# Patient Record
Sex: FEMALE | Race: WHITE | NOT HISPANIC OR LATINO | Employment: OTHER | URBAN - METROPOLITAN AREA
[De-identification: names, ages, dates, MRNs, and addresses within clinical notes are randomized per-mention and may not be internally consistent; named-entity substitution may affect disease eponyms.]

---

## 2017-04-26 ENCOUNTER — GENERIC CONVERSION - ENCOUNTER (OUTPATIENT)
Dept: OTHER | Facility: OTHER | Age: 57
End: 2017-04-26

## 2017-05-02 ENCOUNTER — GENERIC CONVERSION - ENCOUNTER (OUTPATIENT)
Dept: OTHER | Facility: OTHER | Age: 57
End: 2017-05-02

## 2017-07-19 ENCOUNTER — TRANSCRIBE ORDERS (OUTPATIENT)
Dept: ADMINISTRATIVE | Facility: HOSPITAL | Age: 57
End: 2017-07-19

## 2017-07-19 DIAGNOSIS — G35 MS (MULTIPLE SCLEROSIS) (HCC): Primary | ICD-10-CM

## 2017-07-21 ENCOUNTER — GENERIC CONVERSION - ENCOUNTER (OUTPATIENT)
Dept: OTHER | Facility: OTHER | Age: 57
End: 2017-07-21

## 2017-07-24 ENCOUNTER — GENERIC CONVERSION - ENCOUNTER (OUTPATIENT)
Dept: OTHER | Facility: OTHER | Age: 57
End: 2017-07-24

## 2017-08-04 ENCOUNTER — HOSPITAL ENCOUNTER (OUTPATIENT)
Dept: RADIOLOGY | Facility: HOSPITAL | Age: 57
Discharge: HOME | End: 2017-08-04
Payer: COMMERCIAL

## 2017-08-04 DIAGNOSIS — G35 MS (MULTIPLE SCLEROSIS) (HCC): ICD-10-CM

## 2017-08-04 PROCEDURE — A9585 GADOBUTROL INJECTION: HCPCS | Performed by: RADIOLOGY

## 2017-08-04 PROCEDURE — 72156 MRI NECK SPINE W/O & W/DYE: CPT

## 2017-08-04 PROCEDURE — 70553 MRI BRAIN STEM W/O & W/DYE: CPT

## 2017-08-04 RX ADMIN — GADOBUTROL 4 ML: 604.72 INJECTION INTRAVENOUS at 21:33

## 2017-08-07 ENCOUNTER — HOSPITAL ENCOUNTER (OUTPATIENT)
Dept: RADIOLOGY | Facility: HOSPITAL | Age: 57
Discharge: HOME | End: 2017-08-07
Payer: COMMERCIAL

## 2017-08-07 DIAGNOSIS — G35 MS (MULTIPLE SCLEROSIS) (HCC): ICD-10-CM

## 2017-08-07 PROCEDURE — 72157 MRI CHEST SPINE W/O & W/DYE: CPT

## 2017-08-07 PROCEDURE — A9585 GADOBUTROL INJECTION: HCPCS | Performed by: RADIOLOGY

## 2017-08-07 RX ADMIN — GADOBUTROL 4 ML: 604.72 INJECTION INTRAVENOUS at 11:27

## 2017-10-18 ENCOUNTER — ALLSCRIPTS OFFICE VISIT (OUTPATIENT)
Dept: OTHER | Facility: OTHER | Age: 57
End: 2017-10-18

## 2017-10-20 NOTE — PROGRESS NOTES
Assessment  1  Hyperlipidemia (272 4) (E78 5)   2  Multiple sclerosis (340) (G35)   3  Bladder disorder (596 9) (N32 9)   4  Ambulatory dysfunction (719 7) (R26 2)   5  Insomnia (780 52) (G47 00)   6  Vitamin D deficiency (268 9) (E55 9)    Plan  Flu vaccine need    · Fluzone Quadrivalent Intramuscular Suspension  Hyperlipidemia    · Continue with our present treatment plan ; Status:Complete;   Done: 30UBL9490 08:10AM   · Eat a low fat and low cholesterol diet ; Status:Complete;   Done: 34JCP8554 08:10AM   · Keep a diary of when and what you eat ; Status:Complete;   Done: 78KPM5091 08:10AM   · There are many exercise options for seniors ; Status:Complete;   Done: 40HEQ1908  08:10AM   · We recommend that you bring your body mass index down to 26 ; Status:Complete;    Done: 61MRB3078 08:10AM   · We recommend that you follow the Mediterranean diet ; Status:Complete;   Done:  91NLP8622 08:10AM   · You need to quit smoking ; Status:Complete;   Done: 04MXX7893 08:10AM   · Call 911 if: You experience a new kind of chest pain (angina) or pressure ;  Status:Complete;   Done: 66DII3276 08:10AM   · Call 911 if: You have any symptoms of a stroke ; Status:Complete;   Done: 37MXT5670  08:10AM  Insomnia    · From  Zolpidem Tartrate 10 MG Oral Tablet TAKE 1 TABLET Bedtime PRN To  Zolpidem Tartrate 10 MG Oral Tablet (Ambien) 1 Every Day At Bedtime, As Needed    Discussion/Summary    1  Hyperlipidemia-stable 2  MS-stable 3  Bladder disorder-continue present medication and self catheterization  4  Ambulatory dysfunction-fairly stable  5  Vitamin-D deficiency-stable  Patient is otherwise up-to-date on her routine care  She does mention upper GI symptoms consistent with possible GERD  She does take over-the-counter medication and feels that Rolaids work the best  She will try to call her GI doctor and have an EGD before going to [place] flu shot given today   Patient is very good friend and wish her well as she and her  move out of the area  Will likely need primary care follow-up up in [place]  The patient was counseled regarding diagnostic results,-- instructions for management,-- risk factor reductions,-- prognosis,-- patient and family education,-- impressions,-- risks and benefits of treatment options,-- importance of compliance with treatment  total time of encounter was 25 minutes-- and-- > 50% minutes was spent counseling  Chief Complaint  F/U HTN and cholesterol  ksd,cma      History of Present Illness  The patient states her hyperlipidemia has been under good control since the last visit  She has no comorbid illnesses  Symptoms: The patient is currently asymptomatic    50-year-old white female with history of MS here for blood pressure check and review of labs  Since last visit her blood pressures have been quite fine therefore she stopped blood pressure medicine  Under a lot of stress as a result of her 's new job and relocation to [place] in the next few months  Has 2 children in college  Up-to-date with her MRIs and her Neurology evaluations  Bladder issues are stable  Not driving any longer  Up-to-date with gyn care  Requesting flu shot  Has been using a rolling walker without too much difficulty  Review of Systems    Constitutional: as noted in HPI  Eyes: No complaints of eye pain, no red eyes, no eyesight problems, no discharge, no dry eyes, no itching of eyes  ENT: no complaints of earache, no loss of hearing, no nose bleeds, no nasal discharge, no sore throat, no hoarseness  Cardiovascular: No complaints of slow heart rate, no fast heart rate, no chest pain, no palpitations, no leg claudication, no lower extremity edema  Respiratory: No complaints of shortness of breath, no wheezing, no cough, no SOB on exertion, no orthopnea, no PND  Gastrointestinal: No complaints of abdominal pain, no constipation, no nausea or vomiting, no diarrhea, no bloody stools  Genitourinary: as noted in HPI  Musculoskeletal: as noted in HPI  Integumentary: no rashes  Neurological: as noted in HPI-- and-- no headache  Psychiatric: as noted in HPI,-- no anxiety-- and-- no depression  Hematologic/Lymphatic: no tendency for easy bleeding  Active Problems  1  Bladder disorder (596 9) (N32 9)   2  Edema (782 3) (R60 9)   3  Encounter for screening colonoscopy (V76 51) (Z12 11)   4  Encounter for screening mammogram for malignant neoplasm of breast (V76 12)   (Z12 31)   5  Foot Pain (Soft Tissue)   6  Hyperlipidemia (272 4) (E78 5)   7  Insomnia (780 52) (G47 00)   8  Migraine headache (346 90) (G43 909)   9  Multiple sclerosis (340) (G35)   10  Need for influenza vaccination (V04 81) (Z23)   11  Need for prophylactic vaccination and inoculation against influenza (V04 81) (Z23)   12  Osteoporosis (733 00) (M81 0)   13  Urinary incontinence (788 30) (R32)   14  Vitamin D deficiency (268 9) (E55 9)    Past Medical History  1  History of Ankle pain (719 47) (M25 579)   2  History of Backache (724 5) (M54 9)   3  History of Benign essential hypertension (401 1) (I10)   4  History of Foot injury (959 7) (S99 929A)   5  History of chest pain (V13 89) (Z87 898)   6  History of osteopenia (V13 59) (Z87 39)   7  History of upper respiratory infection (V12 09) (Z87 09)   8  History of urinary tract infection (V13 02) (Z87 440)   9  History of Need for influenza vaccination (V04 81) (Z23)   10  Need for prophylactic vaccination and inoculation against influenza (V04 81) (Z23)    Surgical History  1  History of  Section   2  History of Colonoscopy   3  History of Hysterectomy   4  History of Tubal Ligation    Family History  Mother    1  Family history of Lung Cancer (V16 1)  Father    2  Family history of Alcoholism  Sister    3  Family history of Malignant neoplasm of transverse colon  Family History    4   Family history of Acute Myocardial Infarction (V17 3)    Social History   · Being A Social Drinker   · Denied: History of Drug Use   · Never A Smoker    Current Meds   1  Ampyra 10 MG Oral Tablet Extended Release 12 Hour; Take 1 tablet twice daily; Therapy: (Recorded:05Rpz0890) to Recorded   2  Baclofen 10 MG Oral Tablet; TAKE 1 TABLET TWICE DAILY; Therapy: (Recorded:96Key4425) to Recorded   3  Baclofen 20 MG Oral Tablet; TAKE 1 TABLET THREE TIMES A DAY; Therapy: (Recorded:14Xmd9549) to Recorded   4  Boniva 150 MG Oral Tablet; TAKE 1 TABLET ONCE MONTHLY  Requested for:   71Pra1674; Last Rx:39Pgv2519 Ordered   5  Butalbital-APAP-Caffeine -40 MG Oral Tablet; take 1 tablet every 6 hours as   needed for migraines; Therapy: 78CZB0809 to (Last Rx:29Ski2751)  Requested for: 16RNJ8143 Ordered   6  Calcium 600 MG Oral Tablet; Therapy: (Recorded:71Ksg8987) to Recorded   7  Cranberry CAPS; Therapy: (Recorded:90Xgo4699) to Recorded   8  Evening Primrose OIL; Therapy: (Recorded:20Nov2014) to Recorded   9  Fiber CHEW;   Therapy: (Recorded:29Qrb8111) to Recorded   10  Furosemide 40 MG Oral Tablet; TAKE 2 TABLETS EVERY WEEK AS NEEDED FOR    EDEMA; Therapy: 26HJF7857 to (Last Rx:47Wws7526)  Requested for: 26FUB5051 Ordered   11  Gelnique 10 % Transdermal Gel; USE AS DIRECTED, USE WITH SANCTURA AND    OXYBUTYNIN;    Therapy: 82TOC5659 to (Last Rx:99Akn9766)  Requested for: 21Tcm6215 Ordered   12  Gelnique 3 (28) % (MG/ACT) GEL; USE AS DIRECTED, USE WITH SANCTURA AND    OXYBUTYNIN;    Therapy: 05KQQ2508 to (Last Rx:90Hig5340)  Requested for: 19Ham3775 Ordered   13  Nitrofurantoin Monohyd Macro 100 MG Oral Capsule; TAKE 1 CAPSULE EVERY OTHER    DAY AND POST COITAL; Therapy: 61DWK0194 to (Last Rx:73Rsz3819)  Requested for: 02Jan2017 Ordered   14  Oxybutynin Chloride ER 15 MG Oral Tablet Extended Release 24 Hour; TAKE 1 TABLET    DAILY (ALSO TAKE Jerry Reyes 13); Therapy: 41IEA9713 to (Last Rx:11Ogd2495)  Requested for: 30Uwh8717 Ordered   15   Potassium Chloride Cat ER 20 MEQ Oral Tablet Extended Release; Take 1 tablet daily; Therapy: 43Oku0072 to (Last Rx:43Tcw9312)  Requested for: 61XRT6756 Ordered   16  PredniSONE 10 MG Oral Tablet; take as directed; Therapy: 23IEC9204 to (Last Rx:28Nov2016)  Requested for: 02Jan2017 Ordered   17  Restasis 0 05 % Ophthalmic Emulsion; Therapy: (Recorded:36Bgk6560) to Recorded   18  Simvastatin 20 MG Oral Tablet; Take 1 tablet daily; Therapy: 22WKL3119 to (Last Rx:28Nov2016)  Requested for: 02Jan2017 Ordered   19  Sulfamethoxazole-TMP -160 MG TABS; take 1 tablet every twelve hours for four    days prn UTI; Therapy: 12YUU8556 to (Last QD:24TXI3221)  Requested for: 41PJO1833 Ordered   20  Sulfamethoxazole-Trimethoprim 800-160 MG Oral Tablet; TAKE 1 TABLET EVERY 12    HOURS FOR 4 DAYS AS NEEDED FOR URINARY TRACT INFECTION; Therapy: 98TTH4628 to (Last Rx:12Fig2680)  Requested for: 02Sep2017 Ordered   21  Tecfidera 240 MG Oral Capsule Delayed Release; Take 1 capsule twice daily; Therapy: (Recorded:96Mnc0001) to Recorded   22  TiZANidine HCl - 4 MG Oral Tablet; TAKE 1 TABLET 3 times daily; Therapy: (Recorded:96Oub9876) to Recorded   23  Trospium Chloride ER 60 MG Oral Capsule Extended Release 24 Hour; TAKE 1    CAPSULE DAILY (TAKE SANCTURA AND OXYBUTIN  TAKE WITH GELNIQUE AND    OXYBUTIN); Therapy: 93AWZ6858 to (Last Rx:23Cjk6985)  Requested for: 11Lak2365 Ordered   24  Vitamin D3 5000 UNIT Oral Capsule; Therapy: (Recorded:70Aks6791) to Recorded   25  Zolpidem Tartrate 10 MG Oral Tablet; TAKE 1 TABLET Bedtime PRN; Therapy: 01Gip5564 to (Evaluate:46Qmr8322); Last Rx:06Jan2017 Ordered    Allergies  1  No Known Drug Allergies    Vitals  Vital Signs    Recorded: 42IGA9551 03:28ZN   Systolic 831   Diastolic 68   Height 5 ft 5 in   Weight 120 lb    BMI Calculated 19 97   BSA Calculated 1 59     Physical Exam    Constitutional   General appearance: No acute distress, well appearing and well nourished      Eyes   Pupils and irises: Equal, round and reactive to light  Ears, Nose, Mouth, and Throat   Oropharynx: Normal with no erythema, edema, exudate or lesions  Pulmonary   Respiratory effort: No increased work of breathing or signs of respiratory distress  Auscultation of lungs: Clear to auscultation  Cardiovascular   Auscultation of heart: Normal rate and rhythm, normal S1 and S2, without murmurs  Examination of extremities for edema and/or varicosities: Normal     Carotid pulses: Normal     Abdomen   Abdomen: Non-tender, no masses  Lymphatic   Palpation of lymph nodes in neck: No lymphadenopathy  Musculoskeletal   Gait and station: Abnormal  -- needs walker  Digits and nails: Normal without clubbing or cyanosis  Neurologic   Cranial nerves: Cranial nerves 2-12 intact  Psychiatric   Orientation to person, place, and time: Normal     Mood and affect: Normal          Health Management  Encounter for screening colonoscopy   COLONOSCOPY; every 10 years; Last 88FZV9678; Next Due: 23CTU0122; Active  Encounter for screening mammogram for malignant neoplasm of breast   Digital Bilateral Screening Mammogram With CAD; every 1 year; Last 96OZK5635; Next Due:  82MWS0457; Overdue  History of osteopenia   * Dexa Scan Axial Skel (Hip, Pelvis, Spine); every 2 years; Last 43XKI7945; Next Due: 79Vjt2297;   Overdue    Signatures   Electronically signed by : Amada Baumgarten, DO; Oct 19 3872  8:12AM EST                       (Author)

## 2018-01-14 VITALS
DIASTOLIC BLOOD PRESSURE: 68 MMHG | BODY MASS INDEX: 19.99 KG/M2 | SYSTOLIC BLOOD PRESSURE: 114 MMHG | HEIGHT: 65 IN | WEIGHT: 120 LBS

## 2018-01-14 NOTE — PROGRESS NOTES
Chief Complaint  pt here for EKG      Active Problems    1  Benign essential hypertension (401 1) (I10)   2  Edema (782 3) (R60 9)   3  Encounter for screening colonoscopy (V76 51) (Z12 11)   4  Encounter for screening mammogram for malignant neoplasm of breast (V76 12)   (Z12 31)   5  Foot Pain (Soft Tissue)   6  Hyperlipidemia (272 4) (E78 5)   7  Insomnia (780 52) (G47 00)   8  Migraine headache (346 90) (G43 909)   9  Multiple sclerosis (340) (G35)   10  Need for influenza vaccination (V04 81) (Z23)   11  Need for prophylactic vaccination and inoculation against influenza (V04 81) (Z23)   12  Osteopenia (733 90) (M85 80)   13  Osteoporosis (733 00) (M81 0)   14  Upper respiratory infection (465 9) (J06 9)   15  Urinary incontinence (788 30) (R32)   16  Urinary tract infection (599 0) (N39 0)   17  Vitamin D deficiency (268 9) (E55 9)    Current Meds   1  Ampyra 10 MG Oral Tablet Extended Release 12 Hour; Take 1 tablet twice daily; Therapy: (Recorded:15Nov2013) to Recorded   2  Baclofen 10 MG Oral Tablet; TAKE 1 TABLET TWICE DAILY; Therapy: (Recorded:20Nov2014) to Recorded   3  Baclofen 20 MG Oral Tablet; TAKE 1 TABLET THREE TIMES A DAY; Therapy: (Recorded:40Tmc8530) to Recorded   4  Butalbital-APAP-Caffeine -40 MG Oral Tablet; take 1 tablet every 6 hours as   needed for migraines; Therapy: 56UJN3499 to (Last Rx:13Jun2016)  Requested for: 51LDR4684 Ordered   5  Calcium 600 MG Oral Tablet; Therapy: (Recorded:67Eaf2049) to Recorded   6  Cranberry CAPS; Therapy: (Recorded:20Nov2014) to Recorded   7  Evening Primrose OIL; Therapy: (Recorded:20Nov2014) to Recorded   8  Fiber CHEW;   Therapy: (Recorded:20Nov2014) to Recorded   9  Forteo 600 MCG/2 4ML Subcutaneous Solution; INJECT 20 MCG  SUBCUTANEOUSLY   ONCE DAILY AS DIRECTED; Therapy: 46DDF9158 to Recorded   10  Furosemide 40 MG Oral Tablet; TAKE 2 TABLETS EVERY WEEK AS NEEDED FOR    EDEMA;     Therapy: 91WSJ2924 to (Last Rx:47Lhk3529) Requested for: 93Hvz9695 Ordered   11  Gelnique 3 (28) % (MG/ACT) Transdermal Gel; USE AS DIRECTED, USE WITH    SANCTURA AND OXYBUTYNIN;    Therapy: 83NIC5344 to (Last Rx:02Hbv5291)  Requested for: 60Hyq7030 Ordered   12  Nitrofurantoin Monohyd Macro 100 MG Oral Capsule; TAKE 1 CAPSULE EVERY OTHER    DAY AND POST COITAL; Therapy: 12MUG9966 to (Last Rx:81Box9103)  Requested for: 93Hrr2397 Ordered   13  Oxybutynin Chloride ER 15 MG Oral Tablet Extended Release 24 Hour; TAKE 1 TABLET    DAILY  Requested for: 46Dbf8599; Last Rx:10Eov9183 Ordered   14  Potassium Chloride Cat ER 20 MEQ Oral Tablet Extended Release; Take 1 tablet daily; Therapy: 40IBU0678 to (Last Rx:20Nov2014)  Requested for: 23Nov2014 Ordered   15  PredniSONE 10 MG Oral Tablet; take as directed; Therapy: 24WNP3817 to (Last Rx:36Jan7403)  Requested for: 38JFH2852 Ordered   16  Restasis 0 05 % Ophthalmic Emulsion; Therapy: (Recorded:11Lrh7896) to Recorded   17  Simvastatin 20 MG Oral Tablet; Take 1 tablet daily; Therapy: 08ZWK5677 to (Last Rx:37Kcn4387)  Requested for: 48MWN8273 Ordered   18  Sulfamethoxazole-TMP -160 MG TABS; take 1 tablet every twelve hours for four    days prn UTI; Therapy: 60ORB8670 to (Last FH:43PMS4620)  Requested for: 30Jan2015 Ordered   19  Sulfamethoxazole-Trimethoprim 800-160 MG Oral Tablet; TAKE 1 TABLET EVERY 12    HOURS FOR 4 DAYS AS NEEDED FOR URINARY TRACT INFECTION; Therapy: 13KPR6337 to (Last Rx:09Etk1150)  Requested for: 38Rzq0394 Ordered   20  Tecfidera 240 MG Oral Capsule Delayed Release; Take 1 capsule twice daily; Therapy: (Recorded:16Uyl8446) to Recorded   21  TiZANidine HCl - 4 MG Oral Tablet; TAKE 1 TABLET 3 times daily; Therapy: (Recorded:94Fes2408) to Recorded   22  Trospium Chloride ER 60 MG Oral Capsule Extended Release 24 Hour; take 1 capsule    daily; Therapy: 72NVX5319 to (Evaluate:22Ety7973)  Requested for: 15Nvy3688; Last    Rx:82Jsk4012 Ordered   23   Valsartan 80 MG Oral Tablet; TAKE 1 TABLET DAILY (NEW STRENGTH); Therapy: 88VCK2488 to (Last Rx:55Eaj1790)  Requested for: 19Oct2015 Ordered   24  Zolpidem Tartrate 10 MG Oral Tablet; TAKE 1 TABLET Bedtime PRN; Therapy: 86Yxz7762 to (Evaluate:27Mbb5965); Last Rx:88Ibq0439 Ordered    Allergies    1   No Known Drug Allergies    Results/Data  EKG/ECG- POC 21HAB7305 08:13BM Paola Jones     Test Name Result Flag Reference   EKG/ECG 07/18/2016         Plan  Chest pain    · EKG/ECG- POC; Status:Complete - Retrospective By Protocol Authorization;   Done:  01WNJ1494 12:33PM    Signatures   Electronically signed by : Ashley Toro DO; Jul 18 1992  1:07PM EST                       (Author)

## 2018-01-23 NOTE — PROGRESS NOTES
Assessment   1  Encounter for preventive health examination (V70 0) (Z00 00)  2  Benign essential hypertension (401 1) (I10)  3  Hyperlipidemia (272 4) (E78 5)  4  Vitamin D deficiency (268 9) (E55 9)  5  Multiple sclerosis (340) (G35)  6  Bladder disorder (596 9) (N32 9)    Plan  Hyperlipidemia    · (1) LIPID PANEL, FASTING; Status:Active - Retrospective By Protocol Authorization; Requested for:[id number];   Hyperlipidemia, Multiple sclerosis,     · Renew: Potassium Chloride Cat ER 20 MEQ Oral Tablet Extended Release; Take 1  tablet daily  Need for influenza vaccination    · Administered: Fluzone Quadrivalent Intramuscular Suspension    Discussion/Summary  health maintenance visit1  Currently, she  eats a healthy diet1  1   TAH1  Breast cancer screening:  mammogram is current1  1   Colorectal cancer screening:  colorectal cancer screening is current1  1   Osteoporosis screening:  bone mineral density testing is current1  1   The immunizations are needed1  and  Flu shot today1   Advice and education were given regarding  nutrition1  ,  weight bearing exercise1  ,  self skin examination1  and  seat belt use1  1   HM done  LP and BP stable  CPM   aakash 1 yr  Vit D revd  On replacement  MS revd  Need notes  1    The  patient1  was counseled regarding1  diagnostic results1 , instructions for management1 , risk factor reductions1 , prognosis1 , patient and family education1 , impressions1 , risks and benefits of treatment options1 , importance of compliance with treatment1         1 Amended By: Rhona Ford; Dec 18 9137 6:43 PM EST    Chief Complaint  pt here today for a yearly PE as well as a flu vaccine  History of Present Illness    HM, Adult Female: The patient is being seen for a  health maintenance1  evaluation1   General Health: The patient's health since the last visit is described as1   Has MS - fairly stable  1   She has regular dental visits1   She complains of vision problems1    Vision care includes1  wearing reading glasses1   Lifestyle:1   She consumes a diverse and healthy diet1   She does not have any weight concerns1   She does not exercise regularly1   She does not use tobacco1   She denies alcohol use1   She denies drug use1   Reproductive health:1  the patient is perimenopausal1   Screening: Cancer screening reviewed and current1   Cervical cancer screening includes 1  a pap smear performed last year1   Breast cancer screening includes1  a mammogram performed last year1   Colorectal cancer screening includes1  a colonoscopy performed within the past ten years1   Metabolic screening reviewed and current1   Cardiovascular risk factors:1  hypertension1  and stress1 , but no diabetes1 , no obesity1  and no tobacco use1   General health risks:1  no previous breast cancer1   Safety elements used:1  seat belt1 , sunscreen1  and smoke detector1   HPI: HM for this 54 yo wf w MS  UTD w neuro, gyn and GI 1        1 Amended By: Annalise Spicer; Dec 18 7289 6:40 PM EST    Review of Systems    Constitutional:1  as noted in HPI1   Eyes:1  as noted in [address]   ENT:1  no complaints of earache, no loss of hearing, no nose bleeds, no nasal discharge, no sore throat, no hoarseness1   Cardiovascular:1  No complaints of slow heart rate, no fast heart rate, no chest pain, no palpitations, no leg claudication, no lower extremity edema1   Respiratory:1  No complaints of shortness of breath, no wheezing, no cough, no SOB on exertion, no orthopnea, no PND1   Gastrointestinal:1  No complaints of abdominal pain, no constipation, no nausea or vomiting, no diarrhea, no bloody stools1   Genitourinary:1  self cath's1   Musculoskeletal:1  as noted in [address]   Integumentary:1  no rashes1   Neurological:1  as noted in [address]   Psychiatric:1  as noted in [address]   1 Amended By: Annalise Spicer; Dec 18 7909 6:40 PM EST    Active Problems   1  Benign essential hypertension (401 1) (I10)  2   Chest pain (786 50) (R07 9)  3  Edema (782 3) (R60 9)  4  Encounter for screening colonoscopy (V76 51) (Z12 11)  5  Encounter for screening mammogram for malignant neoplasm of breast (V76 12)   (Z12 31)  6  Foot Pain (Soft Tissue)  7  Hyperlipidemia (272 4) (E78 5)  8  Insomnia (780 52) (G47 00)  9  Migraine headache (346 90) (G43 909)  10  Multiple sclerosis (340) (G35)  11  Need for prophylactic vaccination and inoculation against influenza (V04 81) (Z23)  12  Osteopenia (733 90) (M85 80)  13  Osteoporosis (733 00) (M81 0)  14  Upper respiratory infection (465 9) (J06 9)  15  Urinary incontinence (788 30) (R32)  16  Urinary tract infection (599 0) (N39 0)  17  Vitamin D deficiency (268 9) (E55 9)    Past Medical History    · History of Ankle pain (719 47) (M25 579)   · History of Backache (724 5) (M54 9)   · History of Foot injury (959 7) (W99 485A)   · Need for prophylactic vaccination and inoculation against influenza (V04 81) (Z23)    Surgical History    · History of  Section   · History of Hysterectomy   · History of Tubal Ligation    Family History  Mother    · Family history of Lung Cancer (V16 1)  Father    · Family history of Alcoholism  Sister    · Family history of Malignant neoplasm of transverse colon  Family History    · Family history of Acute Myocardial Infarction (V17 3)    Social History    · Being A Social Drinker   · Denied: History of Drug Use   · Never A Smoker    Current Meds  1  Ampyra 10 MG Oral Tablet Extended Release 12 Hour; Take 1 tablet twice daily; Therapy: (Recorded:33Efl6297) to Recorded  2  Baclofen 10 MG Oral Tablet; TAKE 1 TABLET TWICE DAILY; Therapy: (Recorded:82Spb9912) to Recorded  3  Baclofen 20 MG Oral Tablet; TAKE 1 TABLET THREE TIMES A DAY; Therapy: (Recorded:36Rqd6653) to Recorded  4  Butalbital-APAP-Caffeine -40 MG Oral Tablet; take 1 tablet every 6 hours as   needed for migraines; Therapy: 04TER3843 to (Last Rx:2016)  Requested for: 87ESE3140 Ordered  5  Calcium 600 MG Oral Tablet; Therapy: (Recorded:04Gie8729) to Recorded  6  Cranberry CAPS; Therapy: (Recorded:20Nov2014) to Recorded  7  Evening Primrose OIL; Therapy: (Recorded:20Nov2014) to Recorded  8  Fiber CHEW;   Therapy: (Recorded:20Nov2014) to Recorded  9  Forteo 600 MCG/2 4ML Subcutaneous Solution; INJECT 20 MCG  SUBCUTANEOUSLY   ONCE DAILY AS DIRECTED; Therapy: 74ZVW7825 to Recorded  10  Furosemide 40 MG Oral Tablet; TAKE 2 TABLETS EVERY WEEK AS NEEDED FOR    EDEMA; Therapy: 23VXZ6950 to (Last Rx:06Xxf5752)  Requested for: 21Feb2016 Ordered  11  Gelnique 3 (28) % (MG/ACT) GEL; USE AS DIRECTED, USE WITH SANCTURA AND    OXYBUTYNIN;    Therapy: 57WRD4758 to (Last Rx:75Wzi5834)  Requested for: 21Feb2016 Ordered  12  Nitrofurantoin Monohyd Macro 100 MG Oral Capsule; TAKE 1 CAPSULE EVERY OTHER    DAY AND POST COITAL; Therapy: 14NFY7702 to (Last Rx:28Nov2016)  Requested for: 28Nov2016; Status:    ACTIVE - Retrospective By Protocol Authorization Ordered  13  Oxybutynin Chloride ER 15 MG Oral Tablet Extended Release 24 Hour; TAKE 1 TABLET    DAILY (ALSO TAKE Jerry Reyes 13); Therapy: 69COZ3518 to (Last Rx:13Oct2016)  Requested for: 30Oct2016 Ordered  14  Potassium Chloride Cat ER 20 MEQ Oral Tablet Extended Release; Take 1 tablet daily; Therapy: 15DQZ3399 to (Last Rx:20Nov2014)  Requested for: 23Nov2014 Ordered  15  PredniSONE 10 MG Oral Tablet; take as directed; Therapy: 81WIH3498 to (Last Rx:28Nov2016)  Requested for: 28Nov2016; Status:    ACTIVE - Retrospective By Protocol Authorization Ordered  16  Restasis 0 05 % Ophthalmic Emulsion; Therapy: (Recorded:79Chf4256) to Recorded  17  Simvastatin 20 MG Oral Tablet; Take 1 tablet daily; Therapy: 87LYN3012 to (Last Rx:28Nov2016)  Requested for: 28Nov2016; Status:    ACTIVE - Retrospective By Protocol Authorization Ordered  18   Sulfamethoxazole-TMP -160 MG TABS; take 1 tablet every twelve hours for four    days prn UTI; Therapy: 10ZOF6658 to (Last JQ:62MON3365)  Requested for: 30Jan2015 Ordered  19  Sulfamethoxazole-Trimethoprim 800-160 MG Oral Tablet; TAKE 1 TABLET EVERY 12    HOURS FOR 4 DAYS AS NEEDED FOR URINARY TRACT INFECTION; Therapy: 80XEN6274 to (Last Rx:31Lno8219)  Requested for: 26Nqx5695 Ordered  20  Tecfidera 240 MG Oral Capsule Delayed Release; Take 1 capsule twice daily; Therapy: (Recorded:20Hgj2774) to Recorded  21  TiZANidine HCl - 4 MG Oral Tablet; TAKE 1 TABLET 3 times daily; Therapy: (Recorded:30Xtw4294) to Recorded  22  Trospium Chloride ER 60 MG Oral Capsule Extended Release 24 Hour; TAKE 1    CAPSULE DAILY (TAKE SANCTURA AND OXYBUTIN  TAKE WITH GELNIQUE AND    OXYBUTIN); Therapy: 56REY3216 to (Last Rx:13Oct2016)  Requested for: 60CFC5456 Ordered  23  Valsartan 80 MG Oral Tablet; Take 1 tablet daily; Therapy: 14ITC4581 to (Last Rx:28Nov2016)  Requested for: 28Nov2016; Status:    ACTIVE - Retrospective By Protocol Authorization Ordered  24  Zolpidem Tartrate 10 MG Oral Tablet; TAKE 1 TABLET Bedtime PRN; Therapy: 62Xyp3516 to (Evaluate:87Yzm6116); Last Rx:74Hks4871 Ordered    Allergies   1  No Known Drug Allergies    Vitals   Recorded: 31BBY7240 30:20WR   Systolic 592   Diastolic 84   Height 5 ft 5 in   Weight 120 lb 4 00 oz   BMI Calculated 20 01   BSA Calculated 1 59     Physical Exam    Constitutional1    General appearance: No acute distress, well appearing and well nourished  1    Eyes1    Pupils and irises: Equal, round and reactive to light  1    Ears, Nose, Mouth, and Throat1    Otoscopic examination: Tympanic membranes translucent with normal light reflex  Canals patent without erythema  1    Oropharynx: Normal with no erythema, edema, exudate or lesions  1    Pulmonary1    Respiratory effort: No increased work of breathing or signs of respiratory distress  1    Auscultation of lungs: Clear to auscultation  1    Cardiovascular1    Auscultation of heart: Normal rate and rhythm, normal S1 and S2, without murmurs  1    Examination of extremities for edema and/or varicosities: Normal 1    Abdomen1    Abdomen: Non-tender, no masses  1    Lymphatic1    Palpation of lymph nodes in neck: No lymphadenopathy  1    Musculoskeletal1    Gait and station: Abnormal  1  Needs cane  1   Skin1    Skin and subcutaneous tissue: Normal without rashes or lesions  1    Neurologic1    Cranial nerves: Cranial nerves 2-12 intact  1    Psychiatric1    Orientation to person, place, and time: Normal 1    Mood and affect: Normal 1        1 Amended By: Tyler Hou; Dec 18 5554 6:41 PM EST    Health Management  Encounter for screening colonoscopy   COLONOSCOPY; every 10 years; Last 88GIE5805; Next Due: 60GTY5747; Active  Encounter for screening mammogram for malignant neoplasm of breast   Digital Bilateral Screening Mammogram With CAD; every 1 year; Last 81OPS8619; Next  Due: 98CJN3528; Overdue  Osteopenia   * Dexa Scan Axial Skel (Hip, Pelvis, Spine); every 2 years; Last 81YTY9654; Next Due:  67Ggr1877;  Active    Future Appointments    Date/Time Provider Specialty Site   65/57/2659 02:42 PM Tyler Hou DO Family Medicine [facility]     Signatures   Electronically signed by : Carlota Pagan DO; Dec 18 9368  6:43PM EST                       (Author)

## 2018-02-24 DIAGNOSIS — R51.9 NONINTRACTABLE EPISODIC HEADACHE, UNSPECIFIED HEADACHE TYPE: ICD-10-CM

## 2018-02-24 DIAGNOSIS — N31.9 BLADDER DYSFUNCTION: ICD-10-CM

## 2018-02-24 DIAGNOSIS — G35 MULTIPLE SCLEROSIS (HCC): Primary | ICD-10-CM

## 2018-02-26 RX ORDER — PREDNISONE 10 MG/1
TABLET ORAL
Qty: 60 TABLET | Refills: 2 | Status: SHIPPED | OUTPATIENT
Start: 2018-02-26 | End: 2019-01-14 | Stop reason: SDUPTHER

## 2018-02-26 RX ORDER — OXYBUTYNIN CHLORIDE 15 MG/1
TABLET, EXTENDED RELEASE ORAL
Qty: 90 TABLET | Refills: 2 | Status: SHIPPED | OUTPATIENT
Start: 2018-02-26 | End: 2018-10-05 | Stop reason: SDUPTHER

## 2018-02-26 RX ORDER — TROSPIUM CHLORIDE ER 60 MG/1
CAPSULE ORAL
Qty: 90 CAPSULE | Refills: 2 | Status: SHIPPED | OUTPATIENT
Start: 2018-02-26 | End: 2018-11-09 | Stop reason: ALTCHOICE

## 2018-02-26 RX ORDER — BUTALBITAL, ACETAMINOPHEN AND CAFFEINE 50; 325; 40 MG/1; MG/1; MG/1
TABLET ORAL
Qty: 360 TABLET | Refills: 1 | Status: SHIPPED | OUTPATIENT
Start: 2018-02-26 | End: 2018-09-13 | Stop reason: SDUPTHER

## 2018-04-06 DIAGNOSIS — G35 MULTIPLE SCLEROSIS (HCC): Primary | ICD-10-CM

## 2018-04-06 PROBLEM — R26.2 AMBULATORY DYSFUNCTION: Status: ACTIVE | Noted: 2017-10-19

## 2018-04-08 LAB
ALBUMIN SERPL-MCNC: 4.7 G/DL (ref 3.6–5.1)
ALBUMIN/GLOB SERPL: 1.9 (CALC) (ref 1–2.5)
ALP SERPL-CCNC: 34 U/L (ref 33–130)
ALT SERPL-CCNC: 14 U/L (ref 6–29)
AST SERPL-CCNC: 13 U/L (ref 10–35)
BASOPHILS # BLD AUTO: 11 CELLS/UL (ref 0–200)
BASOPHILS NFR BLD AUTO: 0.3 %
BILIRUB SERPL-MCNC: 0.3 MG/DL (ref 0.2–1.2)
BUN SERPL-MCNC: 18 MG/DL (ref 7–25)
BUN/CREAT SERPL: NORMAL (CALC) (ref 6–22)
CALCIUM SERPL-MCNC: 9.8 MG/DL (ref 8.6–10.4)
CHLORIDE SERPL-SCNC: 105 MMOL/L (ref 98–110)
CO2 SERPL-SCNC: 27 MMOL/L (ref 20–31)
CREAT SERPL-MCNC: 0.72 MG/DL (ref 0.5–1.05)
EOSINOPHIL # BLD AUTO: 49 CELLS/UL (ref 15–500)
EOSINOPHIL NFR BLD AUTO: 1.3 %
ERYTHROCYTE [DISTWIDTH] IN BLOOD BY AUTOMATED COUNT: 13.4 % (ref 11–15)
GLOBULIN SER CALC-MCNC: 2.5 G/DL (CALC) (ref 1.9–3.7)
GLUCOSE SERPL-MCNC: 91 MG/DL (ref 65–99)
HCT VFR BLD AUTO: 38.4 % (ref 35–45)
HGB BLD-MCNC: 12.8 G/DL (ref 11.7–15.5)
LYMPHOCYTES # BLD AUTO: 992 CELLS/UL (ref 850–3900)
LYMPHOCYTES NFR BLD AUTO: 26.1 %
MCH RBC QN AUTO: 30.6 PG (ref 27–33)
MCHC RBC AUTO-ENTMCNC: 33.3 G/DL (ref 32–36)
MCV RBC AUTO: 91.9 FL (ref 80–100)
MONOCYTES # BLD AUTO: 232 CELLS/UL (ref 200–950)
MONOCYTES NFR BLD AUTO: 6.1 %
NEUTROPHILS # BLD AUTO: 2516 CELLS/UL (ref 1500–7800)
NEUTROPHILS NFR BLD AUTO: 66.2 %
PLATELET # BLD AUTO: 233 THOUSAND/UL (ref 140–400)
PMV BLD REES-ECKER: 11 FL (ref 7.5–12.5)
POTASSIUM SERPL-SCNC: 4.2 MMOL/L (ref 3.5–5.3)
PROT SERPL-MCNC: 7.2 G/DL (ref 6.1–8.1)
RBC # BLD AUTO: 4.18 MILLION/UL (ref 3.8–5.1)
SL AMB EGFR AFRICAN AMERICAN: 108 ML/MIN/1.73M2
SL AMB EGFR NON AFRICAN AMERICAN: 93 ML/MIN/1.73M2
SODIUM SERPL-SCNC: 141 MMOL/L (ref 135–146)
WBC # BLD AUTO: 3.8 THOUSAND/UL (ref 3.8–10.8)

## 2018-05-14 DIAGNOSIS — F51.01 PRIMARY INSOMNIA: Primary | ICD-10-CM

## 2018-05-14 RX ORDER — ZOLPIDEM TARTRATE 10 MG/1
TABLET ORAL
COMMUNITY
Start: 2018-02-24 | End: 2018-05-14 | Stop reason: SDUPTHER

## 2018-05-15 RX ORDER — OXYBUTYNIN CHLORIDE 100 MG/G
GEL TRANSDERMAL
Refills: 0 | Status: CANCELLED | OUTPATIENT
Start: 2018-05-15

## 2018-05-15 RX ORDER — ZOLPIDEM TARTRATE 10 MG/1
10 TABLET ORAL
Qty: 90 TABLET | Refills: 3 | Status: SHIPPED | OUTPATIENT
Start: 2018-05-15 | End: 2018-10-05 | Stop reason: SDUPTHER

## 2018-05-15 RX ORDER — OXYBUTYNIN CHLORIDE 100 MG/G
GEL TRANSDERMAL
COMMUNITY
Start: 2018-04-05 | End: 2018-07-23 | Stop reason: SDUPTHER

## 2018-05-17 ENCOUNTER — TELEPHONE (OUTPATIENT)
Dept: FAMILY MEDICINE CLINIC | Facility: CLINIC | Age: 58
End: 2018-05-17

## 2018-05-17 NOTE — TELEPHONE ENCOUNTER
Spoke with pt re gelnique rx, she received a call from [employer] stating her cost was $650  She normally pays $150 for a 3 month supply  She states it may need auth from us for the insurance to approve  Pt advised we will keep eye on forms to initiate process

## 2018-05-24 DIAGNOSIS — E55.9 VITAMIN D DEFICIENCY: Primary | ICD-10-CM

## 2018-05-24 DIAGNOSIS — N32.9 BLADDER DISORDER: ICD-10-CM

## 2018-05-24 DIAGNOSIS — R60.9 EDEMA, UNSPECIFIED TYPE: ICD-10-CM

## 2018-05-24 DIAGNOSIS — E53.8 VITAMIN B12 DEFICIENCY: ICD-10-CM

## 2018-05-24 DIAGNOSIS — E61.2 MAGNESIUM DEFICIENCY: ICD-10-CM

## 2018-05-30 ENCOUNTER — TRANSCRIBE ORDERS (OUTPATIENT)
Dept: ADMINISTRATIVE | Facility: HOSPITAL | Age: 58
End: 2018-05-30

## 2018-05-30 DIAGNOSIS — G35 MULTIPLE SCLEROSIS (HCC): Primary | ICD-10-CM

## 2018-06-12 ENCOUNTER — HOSPITAL ENCOUNTER (OUTPATIENT)
Dept: RADIOLOGY | Facility: HOSPITAL | Age: 58
Discharge: HOME | End: 2018-06-12
Payer: COMMERCIAL

## 2018-06-12 DIAGNOSIS — G35 MULTIPLE SCLEROSIS (HCC): ICD-10-CM

## 2018-06-12 PROCEDURE — A9585 GADOBUTROL INJECTION: HCPCS | Performed by: RADIOLOGY

## 2018-06-12 PROCEDURE — 72157 MRI CHEST SPINE W/O & W/DYE: CPT

## 2018-06-12 PROCEDURE — 72156 MRI NECK SPINE W/O & W/DYE: CPT

## 2018-06-12 RX ADMIN — GADOBUTROL 4 ML: 604.72 INJECTION INTRAVENOUS at 21:06

## 2018-06-13 ENCOUNTER — HOSPITAL ENCOUNTER (OUTPATIENT)
Dept: RADIOLOGY | Facility: HOSPITAL | Age: 58
Discharge: HOME | End: 2018-06-13
Payer: COMMERCIAL

## 2018-06-13 DIAGNOSIS — G35 MULTIPLE SCLEROSIS (HCC): ICD-10-CM

## 2018-06-13 PROCEDURE — 70553 MRI BRAIN STEM W/O & W/DYE: CPT

## 2018-06-13 PROCEDURE — A9585 GADOBUTROL INJECTION: HCPCS | Performed by: RADIOLOGY

## 2018-06-13 RX ADMIN — GADOBUTROL 5 ML: 604.72 INJECTION INTRAVENOUS at 22:00

## 2018-06-20 DIAGNOSIS — R60.1 GENERALIZED EDEMA: Primary | ICD-10-CM

## 2018-06-20 RX ORDER — FUROSEMIDE 40 MG/1
TABLET ORAL
Qty: 24 TABLET | Refills: 3 | Status: SHIPPED | OUTPATIENT
Start: 2018-06-20 | End: 2018-10-05 | Stop reason: SDUPTHER

## 2018-07-12 ENCOUNTER — TELEPHONE (OUTPATIENT)
Dept: FAMILY MEDICINE CLINIC | Facility: CLINIC | Age: 58
End: 2018-07-12

## 2018-07-12 DIAGNOSIS — N30.00 ACUTE CYSTITIS WITHOUT HEMATURIA: Primary | ICD-10-CM

## 2018-07-12 DIAGNOSIS — G35 MS (MULTIPLE SCLEROSIS) (HCC): Primary | ICD-10-CM

## 2018-07-12 RX ORDER — ESCITALOPRAM OXALATE 10 MG/1
10 TABLET ORAL DAILY
COMMUNITY
End: 2019-10-17

## 2018-07-12 RX ORDER — CIPROFLOXACIN 500 MG/1
500 TABLET, FILM COATED ORAL EVERY 12 HOURS SCHEDULED
Qty: 28 TABLET | Refills: 2 | Status: SHIPPED | OUTPATIENT
Start: 2018-07-12 | End: 2018-07-26

## 2018-07-12 NOTE — TELEPHONE ENCOUNTER
Spoke to Henna Romero to inform her - she was pressing to make sure this is ok bc it is a level 1 interaction and it looks like the tizanidine was newly filled since she has been on cipro, can you please advise

## 2018-07-12 NOTE — TELEPHONE ENCOUNTER
[facility] stating pt was prescribed cipro that interacts with her Tizanidine ; spoke with [place] and verbally told that pt has taken this before without side effect

## 2018-07-22 LAB — HCV AB SER-ACNC: 0.01

## 2018-07-23 DIAGNOSIS — N32.9 BLADDER DISORDER: Primary | ICD-10-CM

## 2018-07-24 RX ORDER — NITROFURANTOIN 25; 75 MG/1; MG/1
CAPSULE ORAL
Qty: 65 CAPSULE | Refills: 3 | Status: SHIPPED | OUTPATIENT
Start: 2018-07-24 | End: 2018-10-05 | Stop reason: SDUPTHER

## 2018-07-24 RX ORDER — OXYBUTYNIN CHLORIDE 100 MG/G
GEL TRANSDERMAL
Qty: 90 G | Refills: 3 | Status: SHIPPED | OUTPATIENT
Start: 2018-07-24 | End: 2018-10-05 | Stop reason: SDUPTHER

## 2018-07-26 LAB
ALBUMIN SERPL-MCNC: 5 G/DL (ref 3.6–5.1)
ALBUMIN/GLOB SERPL: 1.9 (CALC) (ref 1–2.5)
ALP SERPL-CCNC: 33 U/L (ref 33–130)
ALT SERPL-CCNC: 16 U/L (ref 6–29)
AST SERPL-CCNC: 16 U/L (ref 10–35)
BASOPHILS # BLD AUTO: 31 CELLS/UL (ref 0–200)
BASOPHILS NFR BLD AUTO: 0.5 %
BILIRUB SERPL-MCNC: 0.2 MG/DL (ref 0.2–1.2)
BUN SERPL-MCNC: 20 MG/DL (ref 7–25)
BUN/CREAT SERPL: NORMAL (CALC) (ref 6–22)
CALCIUM SERPL-MCNC: 10.1 MG/DL (ref 8.6–10.4)
CHLORIDE SERPL-SCNC: 102 MMOL/L (ref 98–110)
CO2 SERPL-SCNC: 27 MMOL/L (ref 20–31)
CREAT SERPL-MCNC: 0.63 MG/DL (ref 0.5–1.05)
EOSINOPHIL # BLD AUTO: 18 CELLS/UL (ref 15–500)
EOSINOPHIL NFR BLD AUTO: 0.3 %
ERYTHROCYTE [DISTWIDTH] IN BLOOD BY AUTOMATED COUNT: 12.8 % (ref 11–15)
GLOBULIN SER CALC-MCNC: 2.6 G/DL (CALC) (ref 1.9–3.7)
GLUCOSE SERPL-MCNC: 75 MG/DL (ref 65–99)
HBV CORE IGM SERPL QL IA: NORMAL
HBV SURFACE AG SERPL QL IA: NORMAL
HCT VFR BLD AUTO: 39 % (ref 35–45)
HCV AB S/CO SERPL IA: 0.01
HCV AB SERPL QL IA: NORMAL
HGB BLD-MCNC: 12.9 G/DL (ref 11.7–15.5)
LYMPHOCYTES # BLD AUTO: 1312 CELLS/UL (ref 850–3900)
LYMPHOCYTES NFR BLD AUTO: 21.5 %
MCH RBC QN AUTO: 30.7 PG (ref 27–33)
MCHC RBC AUTO-ENTMCNC: 33.1 G/DL (ref 32–36)
MCV RBC AUTO: 92.9 FL (ref 80–100)
MONOCYTES # BLD AUTO: 323 CELLS/UL (ref 200–950)
MONOCYTES NFR BLD AUTO: 5.3 %
NEUTROPHILS # BLD AUTO: 4416 CELLS/UL (ref 1500–7800)
NEUTROPHILS NFR BLD AUTO: 72.4 %
PLATELET # BLD AUTO: 251 THOUSAND/UL (ref 140–400)
PMV BLD REES-ECKER: 10.6 FL (ref 7.5–12.5)
POTASSIUM SERPL-SCNC: 4.1 MMOL/L (ref 3.5–5.3)
PROT SERPL-MCNC: 7.6 G/DL (ref 6.1–8.1)
RBC # BLD AUTO: 4.2 MILLION/UL (ref 3.8–5.1)
SL AMB EGFR AFRICAN AMERICAN: 115 ML/MIN/1.73M2
SL AMB EGFR NON AFRICAN AMERICAN: 100 ML/MIN/1.73M2
SODIUM SERPL-SCNC: 141 MMOL/L (ref 135–146)
WBC # BLD AUTO: 6.1 THOUSAND/UL (ref 3.8–10.8)

## 2018-07-27 LAB
M TB IFN-G CD4+ BCKGRND COR BLD-ACNC: <0 IU/ML
M TB IFN-G CD4+ T-CELLS BLD-ACNC: 0.07 IU/ML
M TB TUBERC IFN-G BLD QL: NEGATIVE
M TB TUBERC IGNF/MITOGEN IGNF CONTROL: 9.12 IU/ML

## 2018-08-01 DIAGNOSIS — M81.0 AGE-RELATED OSTEOPOROSIS WITHOUT CURRENT PATHOLOGICAL FRACTURE: Primary | ICD-10-CM

## 2018-08-01 RX ORDER — IBANDRONATE SODIUM 150 MG/1
TABLET, FILM COATED ORAL
Qty: 3 TABLET | Refills: 3 | Status: SHIPPED | OUTPATIENT
Start: 2018-08-01 | End: 2018-10-05 | Stop reason: SDUPTHER

## 2018-08-31 DIAGNOSIS — N31.9 BLADDER DYSFUNCTION: Primary | ICD-10-CM

## 2018-08-31 NOTE — TELEPHONE ENCOUNTER
Per Dt Manju Grant changing med to Myrbetriq 50 mg daily  Would like a 90 day supply  Please authorize script

## 2018-09-13 DIAGNOSIS — R51.9 NONINTRACTABLE EPISODIC HEADACHE, UNSPECIFIED HEADACHE TYPE: ICD-10-CM

## 2018-09-14 RX ORDER — BUTALBITAL, ACETAMINOPHEN AND CAFFEINE 50; 325; 40 MG/1; MG/1; MG/1
TABLET ORAL
Qty: 360 TABLET | Refills: 1 | Status: SHIPPED | OUTPATIENT
Start: 2018-09-14 | End: 2018-10-05 | Stop reason: SDUPTHER

## 2018-10-05 DIAGNOSIS — E78.5 HYPERLIPIDEMIA, UNSPECIFIED HYPERLIPIDEMIA TYPE: ICD-10-CM

## 2018-10-05 DIAGNOSIS — R60.9 EDEMA, UNSPECIFIED TYPE: ICD-10-CM

## 2018-10-05 DIAGNOSIS — R60.1 GENERALIZED EDEMA: ICD-10-CM

## 2018-10-05 DIAGNOSIS — R51.9 NONINTRACTABLE EPISODIC HEADACHE, UNSPECIFIED HEADACHE TYPE: ICD-10-CM

## 2018-10-05 DIAGNOSIS — F51.01 PRIMARY INSOMNIA: ICD-10-CM

## 2018-10-05 DIAGNOSIS — N32.9 BLADDER DISORDER: ICD-10-CM

## 2018-10-05 DIAGNOSIS — M81.0 AGE-RELATED OSTEOPOROSIS WITHOUT CURRENT PATHOLOGICAL FRACTURE: ICD-10-CM

## 2018-10-05 DIAGNOSIS — G35 MULTIPLE SCLEROSIS (HCC): ICD-10-CM

## 2018-10-05 DIAGNOSIS — G35 MULTIPLE SCLEROSIS (HCC): Primary | ICD-10-CM

## 2018-10-05 DIAGNOSIS — N31.9 BLADDER DYSFUNCTION: ICD-10-CM

## 2018-10-05 RX ORDER — IBANDRONATE SODIUM 150 MG/1
150 TABLET, FILM COATED ORAL
Qty: 3 TABLET | Refills: 3 | Status: SHIPPED | OUTPATIENT
Start: 2018-10-05 | End: 2018-10-05 | Stop reason: SDUPTHER

## 2018-10-05 RX ORDER — OXYBUTYNIN CHLORIDE 15 MG/1
15 TABLET, EXTENDED RELEASE ORAL DAILY
Qty: 90 TABLET | Refills: 3 | Status: SHIPPED | OUTPATIENT
Start: 2018-10-05 | End: 2019-07-19 | Stop reason: SDUPTHER

## 2018-10-05 RX ORDER — BUTALBITAL, ACETAMINOPHEN AND CAFFEINE 50; 325; 40 MG/1; MG/1; MG/1
1 TABLET ORAL EVERY 6 HOURS PRN
Qty: 360 TABLET | Refills: 3 | Status: SHIPPED | OUTPATIENT
Start: 2018-10-05 | End: 2018-10-05 | Stop reason: SDUPTHER

## 2018-10-05 RX ORDER — POTASSIUM CHLORIDE 20 MEQ/1
TABLET, EXTENDED RELEASE ORAL
Qty: 90 TABLET | Refills: 3 | Status: SHIPPED | OUTPATIENT
Start: 2018-10-05 | End: 2018-10-05 | Stop reason: SDUPTHER

## 2018-10-05 RX ORDER — FUROSEMIDE 40 MG/1
TABLET ORAL
Qty: 90 TABLET | Refills: 3 | Status: SHIPPED | OUTPATIENT
Start: 2018-10-05 | End: 2018-10-05 | Stop reason: SDUPTHER

## 2018-10-05 RX ORDER — BACLOFEN 20 MG/1
10 TABLET ORAL 3 TIMES DAILY
Qty: 270 TABLET | Refills: 3 | Status: SHIPPED | OUTPATIENT
Start: 2018-10-05

## 2018-10-05 RX ORDER — BACLOFEN 20 MG/1
10 TABLET ORAL 3 TIMES DAILY
Qty: 90 TABLET | Refills: 3 | Status: SHIPPED | OUTPATIENT
Start: 2018-10-05 | End: 2018-10-05 | Stop reason: SDUPTHER

## 2018-10-05 RX ORDER — FUROSEMIDE 40 MG/1
TABLET ORAL
Qty: 90 TABLET | Refills: 3 | Status: SHIPPED | OUTPATIENT
Start: 2018-10-05 | End: 2019-07-19 | Stop reason: SDUPTHER

## 2018-10-05 RX ORDER — DALFAMPRIDINE 10 MG/1
1 TABLET, FILM COATED, EXTENDED RELEASE ORAL 2 TIMES DAILY
Qty: 90 TABLET | Refills: 3 | Status: SHIPPED | OUTPATIENT
Start: 2018-10-05 | End: 2022-08-08 | Stop reason: SDUPTHER

## 2018-10-05 RX ORDER — SIMVASTATIN 20 MG
20 TABLET ORAL
Qty: 90 TABLET | Refills: 3 | Status: SHIPPED | OUTPATIENT
Start: 2018-10-05 | End: 2019-07-19 | Stop reason: SDUPTHER

## 2018-10-05 RX ORDER — ZOLPIDEM TARTRATE 10 MG/1
10 TABLET ORAL
Qty: 90 TABLET | Refills: 3 | Status: SHIPPED | OUTPATIENT
Start: 2018-10-05 | End: 2018-10-05 | Stop reason: SDUPTHER

## 2018-10-05 RX ORDER — OXYBUTYNIN CHLORIDE 15 MG/1
15 TABLET, EXTENDED RELEASE ORAL DAILY
Qty: 90 TABLET | Refills: 3 | Status: SHIPPED | OUTPATIENT
Start: 2018-10-05 | End: 2018-10-05 | Stop reason: SDUPTHER

## 2018-10-05 RX ORDER — ZOLPIDEM TARTRATE 10 MG/1
10 TABLET ORAL
Qty: 90 TABLET | Refills: 3 | Status: SHIPPED | OUTPATIENT
Start: 2018-10-05 | End: 2019-01-14 | Stop reason: SDUPTHER

## 2018-10-05 RX ORDER — NITROFURANTOIN 25; 75 MG/1; MG/1
CAPSULE ORAL
Qty: 60 CAPSULE | Refills: 3 | Status: SHIPPED | OUTPATIENT
Start: 2018-10-05 | End: 2019-12-03 | Stop reason: SDUPTHER

## 2018-10-05 RX ORDER — SIMVASTATIN 20 MG
20 TABLET ORAL
Qty: 90 TABLET | Refills: 3 | Status: SHIPPED | OUTPATIENT
Start: 2018-10-05 | End: 2018-10-05 | Stop reason: SDUPTHER

## 2018-10-05 RX ORDER — IBANDRONATE SODIUM 150 MG/1
150 TABLET, FILM COATED ORAL
Qty: 3 TABLET | Refills: 3 | Status: SHIPPED | OUTPATIENT
Start: 2018-10-05 | End: 2019-07-19 | Stop reason: SDUPTHER

## 2018-10-05 RX ORDER — TIZANIDINE 4 MG/1
4 TABLET ORAL EVERY 8 HOURS PRN
Qty: 270 TABLET | Refills: 3 | Status: SHIPPED | OUTPATIENT
Start: 2018-10-05 | End: 2018-10-05 | Stop reason: SDUPTHER

## 2018-10-05 RX ORDER — NITROFURANTOIN 25; 75 MG/1; MG/1
CAPSULE ORAL
Qty: 65 CAPSULE | Refills: 3 | Status: SHIPPED | OUTPATIENT
Start: 2018-10-05 | End: 2018-10-05 | Stop reason: SDUPTHER

## 2018-10-05 RX ORDER — BACLOFEN 10 MG/1
10 TABLET ORAL 2 TIMES DAILY
Qty: 180 TABLET | Refills: 3 | Status: SHIPPED | OUTPATIENT
Start: 2018-10-05 | End: 2021-10-21

## 2018-10-05 RX ORDER — POTASSIUM CHLORIDE 20 MEQ/1
TABLET, EXTENDED RELEASE ORAL
Qty: 90 TABLET | Refills: 3 | Status: SHIPPED | OUTPATIENT
Start: 2018-10-05 | End: 2020-02-14 | Stop reason: SDUPTHER

## 2018-10-05 RX ORDER — BUTALBITAL, ACETAMINOPHEN AND CAFFEINE 50; 325; 40 MG/1; MG/1; MG/1
1 TABLET ORAL EVERY 6 HOURS PRN
Qty: 180 TABLET | Refills: 3 | Status: SHIPPED | OUTPATIENT
Start: 2018-10-05 | End: 2019-07-19 | Stop reason: SDUPTHER

## 2018-10-05 RX ORDER — TIZANIDINE 4 MG/1
4 TABLET ORAL EVERY 8 HOURS PRN
Qty: 270 TABLET | Refills: 3 | Status: SHIPPED | OUTPATIENT
Start: 2018-10-05

## 2018-10-05 RX ORDER — BACLOFEN 10 MG/1
10 TABLET ORAL 3 TIMES DAILY
Qty: 90 TABLET | Refills: 3 | Status: SHIPPED | OUTPATIENT
Start: 2018-10-05 | End: 2018-10-05 | Stop reason: SDUPTHER

## 2018-10-10 ENCOUNTER — TELEPHONE (OUTPATIENT)
Dept: FAMILY MEDICINE CLINIC | Facility: CLINIC | Age: 58
End: 2018-10-10

## 2018-10-10 NOTE — TELEPHONE ENCOUNTER
[facility] called and wants the pt to try one of these meds before trying ibandronate (BONIVA) 150    Either:     Fosamax Plus D tabs 4s  OR  Alendronate Sod Tabs 4s or tabs 12s         # [phone]    Hours 9-530pm M-F    Ref#: [id number]

## 2018-10-10 NOTE — TELEPHONE ENCOUNTER
I spoke to the patient, she states she tried Fosamax in the past and had a lot of problems taking this as she had problems with her stomach weekly which is why she switched to [facility] once a month

## 2018-10-10 NOTE — TELEPHONE ENCOUNTER
Can you call Jose Martin Mcknight and let them know what they are saying and see which she would like to do  Double check if she ever took these before and had a problem or not    If she never took 1 of these before they may give us a hard time on approving the generic Boniva

## 2018-10-10 NOTE — TELEPHONE ENCOUNTER
Authorization has been completed and approved  Approvedtoday  WJHWIO:[id number];TDUNST:[id number]; Review Type:Prior Auth; Coverage Start Date:10/10/2018; Coverage End Date:10/10/2019

## 2018-11-09 ENCOUNTER — TELEPHONE (OUTPATIENT)
Dept: FAMILY MEDICINE CLINIC | Facility: CLINIC | Age: 58
End: 2018-11-09

## 2018-11-09 ENCOUNTER — OFFICE VISIT (OUTPATIENT)
Dept: FAMILY MEDICINE CLINIC | Facility: CLINIC | Age: 58
End: 2018-11-09
Payer: COMMERCIAL

## 2018-11-09 VITALS
HEIGHT: 64 IN | WEIGHT: 126 LBS | DIASTOLIC BLOOD PRESSURE: 88 MMHG | BODY MASS INDEX: 21.51 KG/M2 | SYSTOLIC BLOOD PRESSURE: 130 MMHG

## 2018-11-09 DIAGNOSIS — E78.2 MIXED HYPERLIPIDEMIA: ICD-10-CM

## 2018-11-09 DIAGNOSIS — Z00.00 ANNUAL PHYSICAL EXAM: Primary | ICD-10-CM

## 2018-11-09 DIAGNOSIS — I10 ESSENTIAL HYPERTENSION: ICD-10-CM

## 2018-11-09 DIAGNOSIS — G35 MULTIPLE SCLEROSIS (HCC): ICD-10-CM

## 2018-11-09 DIAGNOSIS — R26.2 AMBULATORY DYSFUNCTION: ICD-10-CM

## 2018-11-09 DIAGNOSIS — E55.9 VITAMIN D DEFICIENCY: ICD-10-CM

## 2018-11-09 DIAGNOSIS — N32.9 BLADDER DISORDER: ICD-10-CM

## 2018-11-09 DIAGNOSIS — Z23 NEED FOR INFLUENZA VACCINATION: ICD-10-CM

## 2018-11-09 PROBLEM — T83.518A INFECTION DUE TO URETHRAL CATHETER (HCC): Status: ACTIVE | Noted: 2018-05-31

## 2018-11-09 PROBLEM — N39.0 INFECTION DUE TO URETHRAL CATHETER (HCC): Status: ACTIVE | Noted: 2018-05-31

## 2018-11-09 PROCEDURE — 90471 IMMUNIZATION ADMIN: CPT | Performed by: NURSE PRACTITIONER

## 2018-11-09 PROCEDURE — 90682 RIV4 VACC RECOMBINANT DNA IM: CPT | Performed by: NURSE PRACTITIONER

## 2018-11-09 PROCEDURE — 99396 PREV VISIT EST AGE 40-64: CPT | Performed by: NURSE PRACTITIONER

## 2018-11-09 RX ORDER — OYSTER SHELL CALCIUM WITH VITAMIN D 500; 200 MG/1; [IU]/1
1 TABLET, FILM COATED ORAL
COMMUNITY
End: 2020-05-15

## 2018-11-09 NOTE — PROGRESS NOTES
Assessment/Plan:    Essential hypertension  Stable on current medication regimen  Patient asymptomatic  Multiple sclerosis (HCC)  Stable and has mild improvement since starting new medication  Continue to follow with specialist at [facility]  Bladder disorder  Doing well with self catheterization  Ambulatory dysfunction  Chronic  Stable with front wheel walker  Seeing mild improvement with new medication  No worsening symptoms  Vitamin D deficiency  Will update lab work  Hyperlipidemia  Will update lab work  On Zocor  Flu vaccine given at visit  Overall doing well  Continue to follow with specialists  Schedule colonoscopy  Yearly lab work given to be updated  Will follow up with results  Call us if you experience any worsening symptoms or no improvement  Diagnoses and all orders for this visit:    Mixed hyperlipidemia  -     CBC and differential; Future  -     TSH, 3rd generation; Future  -     Lipid panel; Future    Vitamin D deficiency  -     Vitamin D 25 hydroxy; Future    Essential hypertension  -     CBC and differential; Future  -     Comprehensive metabolic panel; Future    Multiple sclerosis (HCC)  -     CBC and differential; Future    Ambulatory dysfunction  -     CBC and differential; Future    Need for influenza vaccination  -     influenza vaccine, 8750-3332, quadrivalent, recombinant, PF, 0 5 mL, for patients 18 yr+ (FLUBLOK)    Bladder disorder    Other orders  -     Polyethylene Glycol 3350 (MIRALAX PO); Take by mouth        Lifestyle Advice: follow a low fat, low cholesterol diet, reduce exposure to stress and continue current medications          Subjective:      Patient ID: Karlyne Councilman is a 63 y o  female  Patient presents to office today for yearly wellness visit  Overall doing well  Follows at [facility] for her MS  Just recently started new medication, Ocrevus, which is an IV infusion every 6 months   She is having an MRI for brain for follow up at [facility] in December  Due for colonoscopy, will be scheduling closer to [facility] in [facility]  She is up to date with GYN/ Mammogram          The following portions of the patient's history were reviewed and updated as appropriate: allergies, current medications, past family history, past medical history, past social history, past surgical history and problem list     Review of Systems   Constitutional: Negative for chills and fever  HENT: Negative for congestion  Eyes: Negative for pain and visual disturbance  Respiratory: Negative for cough and shortness of breath  Cardiovascular: Negative for chest pain, palpitations and leg swelling  Gastrointestinal: Negative for abdominal pain, diarrhea, nausea and vomiting  Genitourinary: Negative for difficulty urinating and dysuria  Musculoskeletal: Positive for gait problem (chronic, MS)  Negative for arthralgias  Skin: Negative for color change and rash  Neurological: Negative for dizziness, syncope, numbness and headaches  Hematological: Negative for adenopathy  Psychiatric/Behavioral: Negative for agitation and behavioral problems  The patient is not nervous/anxious  Objective:    /88 (BP Location: Left arm, Patient Position: Sitting, Cuff Size: Standard)   Ht 5' 4" (1 626 m)   Wt 57 2 kg (126 lb)   BMI 21 63 kg/m²          Physical Exam   Constitutional: She is oriented to person, place, and time  She appears well-developed  No distress  HENT:   Head: Normocephalic and atraumatic  Right Ear: External ear normal    Left Ear: External ear normal    Nose: Nose normal    Eyes: Conjunctivae and lids are normal  Right eye exhibits no discharge  Left eye exhibits no discharge  Neck: Normal range of motion  Neck supple  No tracheal deviation present  Cardiovascular: Normal rate and regular rhythm  No murmur heard  Pulmonary/Chest: Effort normal and breath sounds normal  No respiratory distress  She has no wheezes  Abdominal: Soft   Bowel sounds are normal  She exhibits no distension  There is no tenderness  There is no guarding  Musculoskeletal: Normal range of motion  She exhibits no edema or deformity  Lymphadenopathy:     She has no cervical adenopathy  Neurological: She is alert and oriented to person, place, and time  Coordination (uses front wheel walker, chronic due to MS) abnormal    Skin: Skin is warm and dry  No rash noted  She is not diaphoretic  No erythema  Psychiatric: She has a normal mood and affect  Her speech is normal and behavior is normal  Judgment and thought content normal  Cognition and memory are normal    Nursing note and vitals reviewed  Patient has no known allergies  Ms Miguel Mckenna had no medications administered during this visit  Health Maintenance   Topic Date Due    DXA SCAN  1960    MAMMOGRAM  2018    Depression Screening PHQ  2019    DTaP,Tdap,and Td Vaccines (2 - Td) 2020    CRC Screening: Colonoscopy  2021    INFLUENZA VACCINE  Completed      Social History     Social History    Marital status: /Civil Union     Spouse name: N/A    Number of children: N/A    Years of education: N/A     Occupational History    Not on file  Social History Main Topics    Smoking status: Never Smoker    Smokeless tobacco: Never Used    Alcohol use Yes      Comment: social    Drug use: No    Sexual activity: Not on file     Other Topics Concern    Not on file     Social History Narrative    No narrative on file      Family History   Problem Relation Age of Onset    Lung cancer Mother     Alcohol abuse Father     Colon cancer Sister         Transverse - stage 1     Heart attack Family         Acute MI      Past Medical History:   Diagnosis Date    Benign essential hypertension     Last Assessed 12/15/2016    Osteopenia     Resolved 10/19/2017      has a past surgical history that includes  section; Colonoscopy; Hysterectomy; and Tubal ligation  Patient Active Problem List    Diagnosis Date Noted    Infection due to urethral catheter ([facility] ) 05/31/2018    Ambulatory dysfunction 10/19/2017    Bladder disorder 12/15/2016    Osteoporosis 12/11/2015    Edema 01/29/2015    Vitamin D deficiency 07/16/2014    Urinary incontinence 05/15/2014    Migraine headache 07/01/2013    Insomnia 08/23/2012    Multiple sclerosis ([facility] ) 08/09/2012    Hyperlipidemia 06/18/2012    Essential hypertension 05/03/2012       Current Outpatient Prescriptions:     baclofen 10 mg tablet, Take 1 tablet (10 mg total) by mouth 2 (two) times a day, Disp: 180 tablet, Rfl: 3    baclofen 20 mg tablet, Take 0 5 tablets (10 mg total) by mouth 3 (three) times a day, Disp: 270 tablet, Rfl: 3    Calcium Carbonate (CALCIUM 600 PO), Take by mouth, Disp: , Rfl:     calcium-vitamin D (OSCAL 500 + D) 500 mg-200 units per tablet, Take 1 tablet by mouth, Disp: , Rfl:     Cranberry 1000 MG CAPS, Take by mouth, Disp: , Rfl:     Dalfampridine ER (AMPYRA) 10 MG TB12, Take 1 tablet (10 mg total) by mouth 2 (two) times a day, Disp: 90 tablet, Rfl: 3    furosemide (LASIX) 40 mg tablet, Use daily as needed for edema, Disp: 90 tablet, Rfl: 3    ibandronate (BONIVA) 150 MG tablet, Take 1 tablet (150 mg total) by mouth every 30 (thirty) days, Disp: 3 tablet, Rfl: 3    Mirabegron ER 50 MG TB24, Take 1 tablet (50 mg total) by mouth daily, Disp: 90 tablet, Rfl: 3    nitrofurantoin (MACROBID) 100 mg capsule, Take 1 capsule every other day and post coital, Disp: 60 capsule, Rfl: 3    oxybutynin (DITROPAN XL) 15 MG 24 hr tablet, Take 1 tablet (15 mg total) by mouth daily, Disp: 90 tablet, Rfl: 3    Oxybutynin Chloride (GELNIQUE) 10 % GEL, Apply to skin every day, Disp: 90 g, Rfl: 3    Polyethylene Glycol 3350 (MIRALAX PO), Take by mouth, Disp: , Rfl:     potassium chloride (K-DUR,KLOR-CON) 20 mEq tablet, Daily as needed for edema, Disp: 90 tablet, Rfl: 3    predniSONE 10 mg tablet, TAKE AS DIRECTED, Disp: 60 tablet, Rfl: 2    simvastatin (ZOCOR) 20 mg tablet, Take 1 tablet (20 mg total) by mouth daily at bedtime, Disp: 90 tablet, Rfl: 3    tiZANidine (ZANAFLEX) 4 mg tablet, Take 1 tablet (4 mg total) by mouth every 8 (eight) hours as needed for muscle spasms, Disp: 270 tablet, Rfl: 3    zolpidem (AMBIEN) 10 mg tablet, Take 1 tablet (10 mg total) by mouth daily at bedtime as needed for sleep, Disp: 90 tablet, Rfl: 3    butalbital-acetaminophen-caffeine (FIORICET,ESGIC) -40 mg per tablet, Take 1 tablet by mouth every 6 (six) hours as needed for migraine (Patient not taking: Reported on 11/9/2018 ), Disp: 180 tablet, Rfl: 3    escitalopram (LEXAPRO) 10 mg tablet, Take 10 mg by mouth daily, Disp: , Rfl:     Fiber, Guar Gum, CHEW, Chew, Disp: , Rfl:

## 2018-11-09 NOTE — PATIENT INSTRUCTIONS
Make appointment for colonoscopy  Stay up to date with GYN/Mammogram   Complete blood work, we will call with results  Call us if you experience any worsening symptoms or no improvement

## 2018-11-10 LAB
25(OH)D3 SERPL-MCNC: 75 NG/ML (ref 30–100)
ALBUMIN SERPL-MCNC: 4.8 G/DL (ref 3.6–5.1)
ALBUMIN/GLOB SERPL: 1.7 (CALC) (ref 1–2.5)
ALP SERPL-CCNC: 42 U/L (ref 33–130)
ALT SERPL-CCNC: 39 U/L (ref 6–29)
AST SERPL-CCNC: 30 U/L (ref 10–35)
BASOPHILS # BLD AUTO: 30 CELLS/UL (ref 0–200)
BASOPHILS NFR BLD AUTO: 0.5 %
BILIRUB SERPL-MCNC: 0.3 MG/DL (ref 0.2–1.2)
BUN SERPL-MCNC: 20 MG/DL (ref 7–25)
BUN/CREAT SERPL: ABNORMAL (CALC) (ref 6–22)
CALCIUM SERPL-MCNC: 10.5 MG/DL (ref 8.6–10.4)
CHLORIDE SERPL-SCNC: 100 MMOL/L (ref 98–110)
CHOLEST SERPL-MCNC: 213 MG/DL
CHOLEST/HDLC SERPL: 2.6 (CALC)
CO2 SERPL-SCNC: 29 MMOL/L (ref 20–32)
CREAT SERPL-MCNC: 0.8 MG/DL (ref 0.5–1.05)
EOSINOPHIL # BLD AUTO: 42 CELLS/UL (ref 15–500)
EOSINOPHIL NFR BLD AUTO: 0.7 %
ERYTHROCYTE [DISTWIDTH] IN BLOOD BY AUTOMATED COUNT: 12.7 % (ref 11–15)
GLOBULIN SER CALC-MCNC: 2.8 G/DL (CALC) (ref 1.9–3.7)
GLUCOSE SERPL-MCNC: 86 MG/DL (ref 65–99)
HCT VFR BLD AUTO: 39.9 % (ref 35–45)
HDLC SERPL-MCNC: 82 MG/DL
HGB BLD-MCNC: 13.4 G/DL (ref 11.7–15.5)
LDLC SERPL CALC-MCNC: 113 MG/DL (CALC)
LYMPHOCYTES # BLD AUTO: 1254 CELLS/UL (ref 850–3900)
LYMPHOCYTES NFR BLD AUTO: 20.9 %
MCH RBC QN AUTO: 30.5 PG (ref 27–33)
MCHC RBC AUTO-ENTMCNC: 33.6 G/DL (ref 32–36)
MCV RBC AUTO: 90.7 FL (ref 80–100)
MONOCYTES # BLD AUTO: 390 CELLS/UL (ref 200–950)
MONOCYTES NFR BLD AUTO: 6.5 %
NEUTROPHILS # BLD AUTO: 4284 CELLS/UL (ref 1500–7800)
NEUTROPHILS NFR BLD AUTO: 71.4 %
NONHDLC SERPL-MCNC: 131 MG/DL (CALC)
PLATELET # BLD AUTO: 262 THOUSAND/UL (ref 140–400)
PMV BLD REES-ECKER: 10.8 FL (ref 7.5–12.5)
POTASSIUM SERPL-SCNC: 4.1 MMOL/L (ref 3.5–5.3)
PROT SERPL-MCNC: 7.6 G/DL (ref 6.1–8.1)
RBC # BLD AUTO: 4.4 MILLION/UL (ref 3.8–5.1)
SL AMB EGFR AFRICAN AMERICAN: 95 ML/MIN/1.73M2
SL AMB EGFR NON AFRICAN AMERICAN: 82 ML/MIN/1.73M2
SODIUM SERPL-SCNC: 138 MMOL/L (ref 135–146)
TRIGL SERPL-MCNC: 84 MG/DL
TSH SERPL-ACNC: 2.08 MIU/L (ref 0.4–4.5)
WBC # BLD AUTO: 6 THOUSAND/UL (ref 3.8–10.8)

## 2018-11-10 NOTE — ASSESSMENT & PLAN NOTE
Stable and has mild improvement since starting new medication  Continue to follow with specialist at [place]

## 2018-11-10 NOTE — ASSESSMENT & PLAN NOTE
Chronic  Stable with front wheel walker  Seeing mild improvement with new medication  No worsening symptoms

## 2018-11-16 DIAGNOSIS — R74.01 ELEVATED ALT MEASUREMENT: Primary | ICD-10-CM

## 2019-01-14 DIAGNOSIS — F51.01 PRIMARY INSOMNIA: ICD-10-CM

## 2019-01-14 DIAGNOSIS — G35 MULTIPLE SCLEROSIS (HCC): ICD-10-CM

## 2019-01-14 RX ORDER — PREDNISONE 10 MG/1
10 TABLET ORAL 2 TIMES DAILY WITH MEALS
Qty: 60 TABLET | Refills: 2 | Status: SHIPPED | OUTPATIENT
Start: 2019-01-14 | End: 2019-07-19 | Stop reason: SDUPTHER

## 2019-01-14 RX ORDER — ZOLPIDEM TARTRATE 10 MG/1
10 TABLET ORAL
Qty: 90 TABLET | Refills: 0 | Status: SHIPPED | OUTPATIENT
Start: 2019-01-14 | End: 2019-02-26 | Stop reason: SDUPTHER

## 2019-01-14 RX ORDER — ZOLPIDEM TARTRATE 10 MG/1
10 TABLET ORAL
Qty: 90 TABLET | Refills: 0 | Status: CANCELLED | OUTPATIENT
Start: 2019-01-14

## 2019-01-14 NOTE — TELEPHONE ENCOUNTER
From: Belen Fitch  Sent: 1/14/2019 1:11 PM EST  Subject: Medication Renewal Request    Belen Fitch would like a refill of the following medications:     predniSONE 10 mg tablet Josette Howell, DO]    [facility]        Medication renewals requested in this message routed separately:     zolpidem (AMBIEN) 10 mg tablet ALISON Dewitt]

## 2019-02-26 DIAGNOSIS — F51.01 PRIMARY INSOMNIA: ICD-10-CM

## 2019-02-26 RX ORDER — ZOLPIDEM TARTRATE 10 MG/1
10 TABLET ORAL
Qty: 90 TABLET | Refills: 3 | Status: SHIPPED | OUTPATIENT
Start: 2019-02-26 | End: 2019-07-19 | Stop reason: SDUPTHER

## 2019-07-19 DIAGNOSIS — R51.9 NONINTRACTABLE EPISODIC HEADACHE, UNSPECIFIED HEADACHE TYPE: ICD-10-CM

## 2019-07-19 DIAGNOSIS — N31.9 BLADDER DYSFUNCTION: ICD-10-CM

## 2019-07-19 DIAGNOSIS — E78.5 HYPERLIPIDEMIA, UNSPECIFIED HYPERLIPIDEMIA TYPE: ICD-10-CM

## 2019-07-19 DIAGNOSIS — F51.01 PRIMARY INSOMNIA: ICD-10-CM

## 2019-07-19 DIAGNOSIS — M81.0 AGE-RELATED OSTEOPOROSIS WITHOUT CURRENT PATHOLOGICAL FRACTURE: ICD-10-CM

## 2019-07-19 DIAGNOSIS — N32.9 BLADDER DISORDER: ICD-10-CM

## 2019-07-19 DIAGNOSIS — R60.1 GENERALIZED EDEMA: ICD-10-CM

## 2019-07-19 DIAGNOSIS — G35 MULTIPLE SCLEROSIS (HCC): ICD-10-CM

## 2019-07-19 RX ORDER — FUROSEMIDE 40 MG/1
TABLET ORAL
Qty: 90 TABLET | Refills: 3 | Status: SHIPPED | OUTPATIENT
Start: 2019-07-19 | End: 2020-02-14 | Stop reason: SDUPTHER

## 2019-07-19 RX ORDER — BUTALBITAL, ACETAMINOPHEN AND CAFFEINE 50; 325; 40 MG/1; MG/1; MG/1
1 TABLET ORAL EVERY 6 HOURS PRN
Qty: 180 TABLET | Refills: 3 | Status: SHIPPED | OUTPATIENT
Start: 2019-07-19 | End: 2020-05-15 | Stop reason: SDUPTHER

## 2019-07-19 RX ORDER — ZOLPIDEM TARTRATE 10 MG/1
10 TABLET ORAL
Qty: 90 TABLET | Refills: 3 | Status: SHIPPED | OUTPATIENT
Start: 2019-07-19 | End: 2020-02-09

## 2019-07-19 RX ORDER — PREDNISONE 10 MG/1
10 TABLET ORAL 2 TIMES DAILY WITH MEALS
Qty: 60 TABLET | Refills: 2 | Status: SHIPPED | OUTPATIENT
Start: 2019-07-19 | End: 2019-10-11 | Stop reason: SDUPTHER

## 2019-07-19 RX ORDER — SIMVASTATIN 20 MG
20 TABLET ORAL
Qty: 90 TABLET | Refills: 3 | Status: SHIPPED | OUTPATIENT
Start: 2019-07-19 | End: 2020-04-06 | Stop reason: SDUPTHER

## 2019-07-19 RX ORDER — OXYBUTYNIN CHLORIDE 15 MG/1
15 TABLET, EXTENDED RELEASE ORAL DAILY
Qty: 90 TABLET | Refills: 3 | Status: SHIPPED | OUTPATIENT
Start: 2019-07-19 | End: 2020-05-15 | Stop reason: SDUPTHER

## 2019-07-19 RX ORDER — IBANDRONATE SODIUM 150 MG/1
150 TABLET, FILM COATED ORAL
Qty: 3 TABLET | Refills: 3 | Status: SHIPPED | OUTPATIENT
Start: 2019-07-19 | End: 2020-05-15
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
## 2019-07-29 RX ORDER — AMOXICILLIN AND CLAVULANATE POTASSIUM 500; 125 MG/1; MG/1
1 TABLET, FILM COATED ORAL EVERY 12 HOURS SCHEDULED
Qty: 14 TABLET | Refills: 0 | Status: SHIPPED | OUTPATIENT
Start: 2019-07-29 | End: 2019-08-05

## 2019-08-05 DIAGNOSIS — R21 RASH: Primary | ICD-10-CM

## 2019-08-05 RX ORDER — KETOCONAZOLE 20 MG/G
CREAM TOPICAL DAILY
Qty: 15 G | Refills: 2 | Status: SHIPPED | OUTPATIENT
Start: 2019-08-05 | End: 2019-10-17

## 2019-10-11 DIAGNOSIS — G35 MULTIPLE SCLEROSIS (HCC): ICD-10-CM

## 2019-10-11 RX ORDER — PREDNISONE 10 MG/1
TABLET ORAL
Qty: 60 TABLET | Refills: 2 | Status: SHIPPED | OUTPATIENT
Start: 2019-10-11 | End: 2020-01-09

## 2019-10-17 ENCOUNTER — OFFICE VISIT (OUTPATIENT)
Dept: FAMILY MEDICINE CLINIC | Facility: CLINIC | Age: 59
End: 2019-10-17
Payer: COMMERCIAL

## 2019-10-17 ENCOUNTER — TRANSCRIBE ORDERS (OUTPATIENT)
Dept: FAMILY MEDICINE CLINIC | Facility: CLINIC | Age: 59
End: 2019-10-17

## 2019-10-17 VITALS
TEMPERATURE: 97.8 F | HEIGHT: 65 IN | HEART RATE: 68 BPM | DIASTOLIC BLOOD PRESSURE: 80 MMHG | RESPIRATION RATE: 16 BRPM | SYSTOLIC BLOOD PRESSURE: 118 MMHG | WEIGHT: 127 LBS | BODY MASS INDEX: 21.16 KG/M2 | OXYGEN SATURATION: 98 %

## 2019-10-17 DIAGNOSIS — I10 ESSENTIAL HYPERTENSION: ICD-10-CM

## 2019-10-17 DIAGNOSIS — F51.01 PRIMARY INSOMNIA: ICD-10-CM

## 2019-10-17 DIAGNOSIS — M81.0 OSTEOPOROSIS, UNSPECIFIED OSTEOPOROSIS TYPE, UNSPECIFIED PATHOLOGICAL FRACTURE PRESENCE: ICD-10-CM

## 2019-10-17 DIAGNOSIS — Z12.11 ENCOUNTER FOR FECAL IMMUNOCHEMICAL TEST SCREENING: ICD-10-CM

## 2019-10-17 DIAGNOSIS — E78.2 MIXED HYPERLIPIDEMIA: Primary | ICD-10-CM

## 2019-10-17 DIAGNOSIS — G35 MULTIPLE SCLEROSIS (HCC): ICD-10-CM

## 2019-10-17 DIAGNOSIS — N32.9 BLADDER DISORDER: ICD-10-CM

## 2019-10-17 DIAGNOSIS — M81.6 LOCALIZED OSTEOPOROSIS WITHOUT CURRENT PATHOLOGICAL FRACTURE: ICD-10-CM

## 2019-10-17 DIAGNOSIS — E55.9 VITAMIN D DEFICIENCY: ICD-10-CM

## 2019-10-17 DIAGNOSIS — Z23 ENCOUNTER FOR IMMUNIZATION: ICD-10-CM

## 2019-10-17 PROCEDURE — 90471 IMMUNIZATION ADMIN: CPT | Performed by: FAMILY MEDICINE

## 2019-10-17 PROCEDURE — 90682 RIV4 VACC RECOMBINANT DNA IM: CPT | Performed by: FAMILY MEDICINE

## 2019-10-17 PROCEDURE — 99214 OFFICE O/P EST MOD 30 MIN: CPT | Performed by: FAMILY MEDICINE

## 2019-10-17 PROCEDURE — 3008F BODY MASS INDEX DOCD: CPT | Performed by: FAMILY MEDICINE

## 2019-10-17 RX ORDER — CYCLOSPORINE 0.5 MG/ML
EMULSION OPHTHALMIC
COMMUNITY
Start: 2019-09-15 | End: 2020-05-15 | Stop reason: SDUPTHER

## 2019-10-17 RX ORDER — SULFAMETHOXAZOLE AND TRIMETHOPRIM 800; 160 MG/1; MG/1
1 TABLET ORAL AS NEEDED
COMMUNITY
End: 2020-02-09 | Stop reason: SDUPTHER

## 2019-10-17 RX ORDER — NITROFURANTOIN MACROCRYSTALS 100 MG/1
CAPSULE ORAL
COMMUNITY
Start: 2013-02-28 | End: 2019-10-17

## 2019-10-22 PROBLEM — M81.6 LOCALIZED OSTEOPOROSIS WITHOUT CURRENT PATHOLOGICAL FRACTURE: Status: ACTIVE | Noted: 2019-07-08

## 2019-10-22 PROBLEM — L90.0 LICHEN SCLEROSUS: Status: ACTIVE | Noted: 2019-07-08

## 2019-10-22 PROBLEM — N90.89 VULVAL LESION: Status: ACTIVE | Noted: 2019-08-22

## 2019-10-22 PROBLEM — R35.0 INCREASED FREQUENCY OF URINATION: Status: ACTIVE | Noted: 2019-08-22

## 2019-10-22 NOTE — PROGRESS NOTES
Assessment/Plan:  Patient's HDL and LDL are at goal and quite good  Patient continues to follow with neurologist both in [place] and in [place] with regards to her multiple sclerosis  Under care with Rheumatology with regards to osteoporosis  Continue bladder medications  Currently without signs of infection  Insomnia and headaches fairly stable  Recommend yearly mammography  Up-to-date with gyn  Flu shot given today  Diagnoses and all orders for this visit:    Mixed hyperlipidemia    Multiple sclerosis ([facility] )    Localized osteoporosis without current pathological fracture    Osteoporosis, unspecified osteoporosis type, unspecified pathological fracture presence    Bladder disorder    Primary insomnia    Encounter for fecal immunochemical test screening  -     Cologuard; Future    Encounter for immunization  -     influenza vaccine, [id number], quadrivalent, recombinant, PF, 0 5 mL, for patients 18 yr+ (FLUBLOK)    Other orders  -     sulfamethoxazole-trimethoprim (BACTRIM DS) 800-160 mg per tablet; Take 1 tablet by mouth as needed  -     RESTASIS 0 05 % ophthalmic emulsion  -     Discontinue: nitrofurantoin (MACRODANTIN) 100 mg capsule; 100mg every other day        1  Mixed hyperlipidemia     2  Multiple sclerosis ([facility] )     3  Localized osteoporosis without current pathological fracture     4  Osteoporosis, unspecified osteoporosis type, unspecified pathological fracture presence     5  Bladder disorder     6  Primary insomnia     7  Encounter for fecal immunochemical test screening  Cologuard   8  Encounter for immunization  influenza vaccine, [id number], quadrivalent, recombinant, PF, 0 5 mL, for patients 18 yr+ (FLUBLOK)       Subjective:        Patient ID: Lisa Angelo is a 63 y o  female    Chief Complaint   Patient presents with    Follow-up     Pt is here for follow up for blood pressure and vaccine updates        76-year-old white female with history of multiple sclerosis here for yearly evaluation of labs  Has multiple specialist including Neurology and Rheumatology overall doing fairly      The following portions of the patient's history were reviewed and updated as appropriate: past medical history, past surgical history and problem list       Review of Systems   Constitutional: Negative for appetite change, fatigue, fever and unexpected weight change  HENT: Negative for congestion, ear pain, postnasal drip, rhinorrhea, sinus pressure, sinus pain and sore throat  Eyes: Negative for redness and visual disturbance  Respiratory: Negative for chest tightness and shortness of breath  Cardiovascular: Negative for chest pain, palpitations and leg swelling  Gastrointestinal: Negative for abdominal distention, abdominal pain, diarrhea and nausea  Endocrine: Negative for cold intolerance and heat intolerance  Genitourinary: Positive for difficulty urinating  Negative for dysuria and hematuria  Musculoskeletal: Positive for gait problem  Skin: Negative for pallor and rash  Neurological: Negative for dizziness and headaches  Psychiatric/Behavioral: Negative for behavioral problems  The patient is not nervous/anxious  Objective:  /80   Pulse 68   Temp 97 8 °F (36 6 °C) (Temporal)   Resp 16   Ht 5' 5" (1 651 m)   Wt 57 6 kg (127 lb)   SpO2 98%   BMI 21 13 kg/m²        Physical Exam   Constitutional: She is oriented to person, place, and time  She appears well-nourished  No distress  HENT:   Head: Normocephalic and atraumatic  Right Ear: Tympanic membrane normal    Left Ear: Tympanic membrane normal    Mouth/Throat: Oropharynx is clear and moist    Eyes: Pupils are equal, round, and reactive to light  Conjunctivae and EOM are normal  No scleral icterus  Neck: Normal range of motion  Neck supple  No thyromegaly present  Cardiovascular: Normal rate, regular rhythm, normal heart sounds and intact distal pulses  No murmur heard    Pulses:       Carotid pulses are 0 on the right side, and 0 on the left side  Pulmonary/Chest: Effort normal and breath sounds normal  No respiratory distress  She has no wheezes  Abdominal: Soft  She exhibits no distension  Musculoskeletal: She exhibits no edema  Needs cane for ambulation   Lymphadenopathy:     She has no cervical adenopathy  Neurological: She is alert and oriented to person, place, and time  She has normal reflexes  No cranial nerve deficit  Skin: Skin is warm  No pallor  Psychiatric: She has a normal mood and affect  Her behavior is normal  Judgment and thought content normal    Nursing note and vitals reviewed

## 2019-11-20 LAB
25(OH)D3 SERPL-MCNC: 39 NG/ML (ref 30–100)
CHOLEST SERPL-MCNC: 206 MG/DL
CHOLEST/HDLC SERPL: 2.7 (CALC)
HDLC SERPL-MCNC: 76 MG/DL
LDLC SERPL CALC-MCNC: 109 MG/DL (CALC)
NONHDLC SERPL-MCNC: 130 MG/DL (CALC)
TRIGL SERPL-MCNC: 100 MG/DL
TSH SERPL-ACNC: 3.57 MIU/L (ref 0.4–4.5)

## 2019-12-03 DIAGNOSIS — N32.9 BLADDER DISORDER: ICD-10-CM

## 2019-12-04 RX ORDER — NITROFURANTOIN 25; 75 MG/1; MG/1
CAPSULE ORAL
Qty: 60 CAPSULE | Refills: 3 | Status: SHIPPED | OUTPATIENT
Start: 2019-12-04 | End: 2020-05-15 | Stop reason: SDUPTHER

## 2020-01-09 DIAGNOSIS — G35 MULTIPLE SCLEROSIS (HCC): ICD-10-CM

## 2020-01-09 RX ORDER — PREDNISONE 10 MG/1
TABLET ORAL
Qty: 60 TABLET | Refills: 0 | Status: SHIPPED | OUTPATIENT
Start: 2020-01-09 | End: 2020-05-15

## 2020-02-07 DIAGNOSIS — F51.01 PRIMARY INSOMNIA: ICD-10-CM

## 2020-02-08 DIAGNOSIS — G35 MULTIPLE SCLEROSIS (HCC): ICD-10-CM

## 2020-02-09 DIAGNOSIS — R39.9 UTI SYMPTOMS: ICD-10-CM

## 2020-02-09 DIAGNOSIS — G35 MULTIPLE SCLEROSIS (HCC): ICD-10-CM

## 2020-02-09 DIAGNOSIS — N32.9 BLADDER DISORDER: Primary | ICD-10-CM

## 2020-02-09 RX ORDER — ZOLPIDEM TARTRATE 10 MG/1
TABLET ORAL
Qty: 90 TABLET | Refills: 1 | Status: SHIPPED | OUTPATIENT
Start: 2020-02-09 | End: 2020-05-15 | Stop reason: SDUPTHER

## 2020-02-09 RX ORDER — SULFAMETHOXAZOLE AND TRIMETHOPRIM 800; 160 MG/1; MG/1
1 TABLET ORAL EVERY 12 HOURS SCHEDULED
Qty: 30 TABLET | Refills: 1 | Status: SHIPPED | OUTPATIENT
Start: 2020-02-09 | End: 2020-05-09

## 2020-02-10 RX ORDER — PREDNISONE 10 MG/1
TABLET ORAL
Qty: 60 TABLET | Refills: 0 | OUTPATIENT
Start: 2020-02-10

## 2020-02-14 DIAGNOSIS — R60.1 GENERALIZED EDEMA: ICD-10-CM

## 2020-02-14 DIAGNOSIS — R60.9 EDEMA, UNSPECIFIED TYPE: ICD-10-CM

## 2020-02-14 RX ORDER — POTASSIUM CHLORIDE 20 MEQ/1
TABLET, EXTENDED RELEASE ORAL
Qty: 90 TABLET | Refills: 3 | Status: SHIPPED | OUTPATIENT
Start: 2020-02-14 | End: 2020-05-15 | Stop reason: SDUPTHER

## 2020-02-14 RX ORDER — FUROSEMIDE 40 MG/1
TABLET ORAL
Qty: 90 TABLET | Refills: 3 | Status: SHIPPED | OUTPATIENT
Start: 2020-02-14 | End: 2020-05-15 | Stop reason: SDUPTHER

## 2020-03-03 ENCOUNTER — ANESTHESIA EVENT (OUTPATIENT)
Dept: OPERATING ROOM | Facility: HOSPITAL | Age: 60
Setting detail: HOSPITAL OUTPATIENT SURGERY
DRG: 494 | End: 2020-03-03
Payer: MEDICARE

## 2020-03-03 ENCOUNTER — APPOINTMENT (OUTPATIENT)
Dept: RADIOLOGY | Facility: HOSPITAL | Age: 60
Setting detail: HOSPITAL OUTPATIENT SURGERY
DRG: 494 | End: 2020-03-03
Attending: ORTHOPAEDIC SURGERY
Payer: MEDICARE

## 2020-03-03 ENCOUNTER — HOSPITAL ENCOUNTER (INPATIENT)
Facility: HOSPITAL | Age: 60
LOS: 2 days | Discharge: HOME | DRG: 494 | End: 2020-03-05
Attending: ORTHOPAEDIC SURGERY | Admitting: ORTHOPAEDIC SURGERY
Payer: MEDICARE

## 2020-03-03 PROBLEM — S82.409A TIBIA/FIBULA FRACTURE: Status: ACTIVE | Noted: 2020-03-03

## 2020-03-03 PROBLEM — S82.209A TIBIA/FIBULA FRACTURE: Status: ACTIVE | Noted: 2020-03-03

## 2020-03-03 PROCEDURE — 71000001 HC PACU PHASE 1 INITIAL 30MIN: Performed by: ORTHOPAEDIC SURGERY

## 2020-03-03 PROCEDURE — 63700000 HC SELF-ADMINISTRABLE DRUG: Performed by: ORTHOPAEDIC SURGERY

## 2020-03-03 PROCEDURE — 25000000 HC PHARMACY GENERAL: Performed by: ORTHOPAEDIC SURGERY

## 2020-03-03 PROCEDURE — 73590 X-RAY EXAM OF LOWER LEG: CPT | Mod: RT

## 2020-03-03 PROCEDURE — 25000000 HC PHARMACY GENERAL: Performed by: NURSE ANESTHETIST, CERTIFIED REGISTERED

## 2020-03-03 PROCEDURE — 25800000 HC PHARMACY IV SOLUTIONS: Performed by: ANESTHESIOLOGY

## 2020-03-03 PROCEDURE — 63600000 HC DRUGS/DETAIL CODE: Performed by: ORTHOPAEDIC SURGERY

## 2020-03-03 PROCEDURE — 36000004 HC OR LEVEL 4 INITIAL 30MIN: Performed by: ORTHOPAEDIC SURGERY

## 2020-03-03 PROCEDURE — 25800000 HC PHARMACY IV SOLUTIONS: Performed by: NURSE ANESTHETIST, CERTIFIED REGISTERED

## 2020-03-03 PROCEDURE — 71000011 HC PACU PHASE 1 EA ADDL MIN: Performed by: ORTHOPAEDIC SURGERY

## 2020-03-03 PROCEDURE — 0QSG04Z REPOSITION RIGHT TIBIA WITH INTERNAL FIXATION DEVICE, OPEN APPROACH: ICD-10-PCS | Performed by: ORTHOPAEDIC SURGERY

## 2020-03-03 PROCEDURE — 63600000 HC DRUGS/DETAIL CODE: Mod: JW | Performed by: ANESTHESIOLOGY

## 2020-03-03 PROCEDURE — 37000001 HC ANESTHESIA GENERAL: Performed by: ORTHOPAEDIC SURGERY

## 2020-03-03 PROCEDURE — 12000000 HC ROOM AND CARE MED/SURG

## 2020-03-03 PROCEDURE — C1713 ANCHOR/SCREW BN/BN,TIS/BN: HCPCS | Performed by: ORTHOPAEDIC SURGERY

## 2020-03-03 PROCEDURE — 27200000 HC STERILE SUPPLY: Performed by: ORTHOPAEDIC SURGERY

## 2020-03-03 PROCEDURE — 0QSJ04Z REPOSITION RIGHT FIBULA WITH INTERNAL FIXATION DEVICE, OPEN APPROACH: ICD-10-PCS | Performed by: ORTHOPAEDIC SURGERY

## 2020-03-03 PROCEDURE — 63600000 HC DRUGS/DETAIL CODE: Performed by: ANESTHESIOLOGY

## 2020-03-03 PROCEDURE — 63600000 HC DRUGS/DETAIL CODE: Performed by: NURSE ANESTHETIST, CERTIFIED REGISTERED

## 2020-03-03 PROCEDURE — 36100390 FL FLUOROSCOPY TECHNICAL ASSISTANCE

## 2020-03-03 PROCEDURE — 36000014 HC OR LEVEL 4 EA ADDL MIN: Performed by: ORTHOPAEDIC SURGERY

## 2020-03-03 PROCEDURE — 25000000 HC PHARMACY GENERAL: Performed by: ANESTHESIOLOGY

## 2020-03-03 DEVICE — IMPLANTABLE DEVICE: Type: IMPLANTABLE DEVICE | Site: LEG | Status: FUNCTIONAL

## 2020-03-03 DEVICE — SCREW LOCKING 3.5 X 26: Type: IMPLANTABLE DEVICE | Site: LEG | Status: FUNCTIONAL

## 2020-03-03 DEVICE — SCREW LOCKING 2.7 X 16MM: Type: IMPLANTABLE DEVICE | Site: LEG | Status: FUNCTIONAL

## 2020-03-03 DEVICE — SCREW LOCKING 2.7 X 20MM: Type: IMPLANTABLE DEVICE | Site: LEG | Status: FUNCTIONAL

## 2020-03-03 DEVICE — SCREW LOCKING 2.7 X 14MM: Type: IMPLANTABLE DEVICE | Site: LEG | Status: FUNCTIONAL

## 2020-03-03 DEVICE — SCREW LOCKING 2.7 X 38MM: Type: IMPLANTABLE DEVICE | Site: LEG | Status: FUNCTIONAL

## 2020-03-03 DEVICE — SCREW LOCKING 2.7 X 12MM: Type: IMPLANTABLE DEVICE | Site: LEG | Status: FUNCTIONAL

## 2020-03-03 DEVICE — SCREW LOCKING 2.7 X 34MM: Type: IMPLANTABLE DEVICE | Site: LEG | Status: FUNCTIONAL

## 2020-03-03 DEVICE — SCREW LOCKING VA 3.5MM X 24MM: Type: IMPLANTABLE DEVICE | Site: LEG | Status: FUNCTIONAL

## 2020-03-03 DEVICE — SCREW LOCKING 2.7 X 28MM: Type: IMPLANTABLE DEVICE | Site: LEG | Status: FUNCTIONAL

## 2020-03-03 DEVICE — SCREW LOCKING 2.7 X 36MM: Type: IMPLANTABLE DEVICE | Site: LEG | Status: FUNCTIONAL

## 2020-03-03 DEVICE — SCREW VA LOCKING 2.7X18MM: Type: IMPLANTABLE DEVICE | Site: LEG | Status: FUNCTIONAL

## 2020-03-03 DEVICE — SCREW LOCKING 2.7 X 30MM: Type: IMPLANTABLE DEVICE | Site: LEG | Status: FUNCTIONAL

## 2020-03-03 RX ORDER — CEFAZOLIN SODIUM 2 G/50ML
SOLUTION INTRAVENOUS
Status: COMPLETED
Start: 2020-03-03 | End: 2020-03-03

## 2020-03-03 RX ORDER — BACLOFEN 10 MG/1
30 TABLET ORAL 4 TIMES DAILY
Status: DISCONTINUED | OUTPATIENT
Start: 2020-03-03 | End: 2020-03-05 | Stop reason: HOSPADM

## 2020-03-03 RX ORDER — ONDANSETRON HYDROCHLORIDE 2 MG/ML
4 INJECTION, SOLUTION INTRAVENOUS
Status: DISCONTINUED | OUTPATIENT
Start: 2020-03-03 | End: 2020-03-05 | Stop reason: HOSPADM

## 2020-03-03 RX ORDER — SODIUM CHLORIDE 9 MG/ML
INJECTION, SOLUTION INTRAVENOUS CONTINUOUS PRN
Status: DISCONTINUED | OUTPATIENT
Start: 2020-03-03 | End: 2020-03-03 | Stop reason: SURG

## 2020-03-03 RX ORDER — HYDROMORPHONE HYDROCHLORIDE 1 MG/ML
0.5 INJECTION, SOLUTION INTRAMUSCULAR; INTRAVENOUS; SUBCUTANEOUS
Status: DISCONTINUED | OUTPATIENT
Start: 2020-03-03 | End: 2020-03-05 | Stop reason: HOSPADM

## 2020-03-03 RX ORDER — ACETAMINOPHEN 325 MG/1
650 TABLET ORAL EVERY 4 HOURS PRN
Status: DISCONTINUED | OUTPATIENT
Start: 2020-03-03 | End: 2020-03-05 | Stop reason: HOSPADM

## 2020-03-03 RX ORDER — IBUPROFEN 200 MG
16-32 TABLET ORAL AS NEEDED
Status: DISCONTINUED | OUTPATIENT
Start: 2020-03-03 | End: 2020-03-05 | Stop reason: HOSPADM

## 2020-03-03 RX ORDER — OXYBUTYNIN CHLORIDE 15 MG/1
15 TABLET, EXTENDED RELEASE ORAL DAILY
Status: DISCONTINUED | OUTPATIENT
Start: 2020-03-04 | End: 2020-03-05 | Stop reason: HOSPADM

## 2020-03-03 RX ORDER — OXYCODONE HYDROCHLORIDE 5 MG/1
5-10 TABLET ORAL EVERY 4 HOURS PRN
Status: DISCONTINUED | OUTPATIENT
Start: 2020-03-03 | End: 2020-03-05 | Stop reason: HOSPADM

## 2020-03-03 RX ORDER — MIDAZOLAM HYDROCHLORIDE 2 MG/2ML
INJECTION, SOLUTION INTRAMUSCULAR; INTRAVENOUS AS NEEDED
Status: DISCONTINUED | OUTPATIENT
Start: 2020-03-03 | End: 2020-03-03 | Stop reason: SURG

## 2020-03-03 RX ORDER — ENOXAPARIN SODIUM 100 MG/ML
40 INJECTION SUBCUTANEOUS
Status: DISCONTINUED | OUTPATIENT
Start: 2020-03-04 | End: 2020-03-05 | Stop reason: HOSPADM

## 2020-03-03 RX ORDER — PROPOFOL 10 MG/ML
INJECTION, EMULSION INTRAVENOUS AS NEEDED
Status: DISCONTINUED | OUTPATIENT
Start: 2020-03-03 | End: 2020-03-03 | Stop reason: SURG

## 2020-03-03 RX ORDER — OXYCODONE HYDROCHLORIDE 5 MG/1
5 TABLET ORAL ONCE AS NEEDED
Status: DISCONTINUED | OUTPATIENT
Start: 2020-03-03 | End: 2020-03-05 | Stop reason: HOSPADM

## 2020-03-03 RX ORDER — PHENYLEPHRINE HYDROCHLORIDE 10 MG/ML
INJECTION INTRAVENOUS AS NEEDED
Status: DISCONTINUED | OUTPATIENT
Start: 2020-03-03 | End: 2020-03-03 | Stop reason: SURG

## 2020-03-03 RX ORDER — ROCURONIUM BROMIDE 10 MG/ML
INJECTION, SOLUTION INTRAVENOUS AS NEEDED
Status: DISCONTINUED | OUTPATIENT
Start: 2020-03-03 | End: 2020-03-03 | Stop reason: SURG

## 2020-03-03 RX ORDER — CEFAZOLIN SODIUM 2 G/50ML
2 SOLUTION INTRAVENOUS
Status: COMPLETED | OUTPATIENT
Start: 2020-03-03 | End: 2020-03-03

## 2020-03-03 RX ORDER — TOLTERODINE 2 MG/1
2 CAPSULE, EXTENDED RELEASE ORAL DAILY
Status: DISCONTINUED | OUTPATIENT
Start: 2020-03-03 | End: 2020-03-03 | Stop reason: SDUPTHER

## 2020-03-03 RX ORDER — ONDANSETRON HYDROCHLORIDE 2 MG/ML
INJECTION, SOLUTION INTRAVENOUS AS NEEDED
Status: DISCONTINUED | OUTPATIENT
Start: 2020-03-03 | End: 2020-03-03 | Stop reason: SURG

## 2020-03-03 RX ORDER — DEXTROSE 40 %
15-30 GEL (GRAM) ORAL AS NEEDED
Status: DISCONTINUED | OUTPATIENT
Start: 2020-03-03 | End: 2020-03-05 | Stop reason: HOSPADM

## 2020-03-03 RX ORDER — DEXTROSE 50 % IN WATER (D50W) INTRAVENOUS SYRINGE
25 AS NEEDED
Status: DISCONTINUED | OUTPATIENT
Start: 2020-03-03 | End: 2020-03-05 | Stop reason: HOSPADM

## 2020-03-03 RX ORDER — FENTANYL CITRATE 50 UG/ML
INJECTION, SOLUTION INTRAMUSCULAR; INTRAVENOUS AS NEEDED
Status: DISCONTINUED | OUTPATIENT
Start: 2020-03-03 | End: 2020-03-03 | Stop reason: SURG

## 2020-03-03 RX ORDER — DEXAMETHASONE SODIUM PHOSPHATE 4 MG/ML
INJECTION, SOLUTION INTRA-ARTICULAR; INTRALESIONAL; INTRAMUSCULAR; INTRAVENOUS; SOFT TISSUE AS NEEDED
Status: DISCONTINUED | OUTPATIENT
Start: 2020-03-03 | End: 2020-03-03 | Stop reason: SURG

## 2020-03-03 RX ORDER — BUTALBITAL, ACETAMINOPHEN AND CAFFEINE 50; 325; 40 MG/1; MG/1; MG/1
1 TABLET ORAL EVERY 6 HOURS PRN
Status: DISCONTINUED | OUTPATIENT
Start: 2020-03-03 | End: 2020-03-05 | Stop reason: HOSPADM

## 2020-03-03 RX ORDER — NITROFURANTOIN 25; 75 MG/1; MG/1
100 CAPSULE ORAL EVERY OTHER DAY
Status: DISCONTINUED | OUTPATIENT
Start: 2020-03-05 | End: 2020-03-05 | Stop reason: HOSPADM

## 2020-03-03 RX ORDER — TOLTERODINE 2 MG/1
4 CAPSULE, EXTENDED RELEASE ORAL DAILY
Status: DISCONTINUED | OUTPATIENT
Start: 2020-03-04 | End: 2020-03-04

## 2020-03-03 RX ORDER — ATORVASTATIN CALCIUM 10 MG/1
10 TABLET, FILM COATED ORAL NIGHTLY
Status: DISCONTINUED | OUTPATIENT
Start: 2020-03-03 | End: 2020-03-05 | Stop reason: HOSPADM

## 2020-03-03 RX ORDER — GLYCOPYRROLATE 0.6MG/3ML
SYRINGE (ML) INTRAVENOUS AS NEEDED
Status: DISCONTINUED | OUTPATIENT
Start: 2020-03-03 | End: 2020-03-03 | Stop reason: SURG

## 2020-03-03 RX ORDER — FENTANYL CITRATE 50 UG/ML
50 INJECTION, SOLUTION INTRAMUSCULAR; INTRAVENOUS
Status: DISCONTINUED | OUTPATIENT
Start: 2020-03-03 | End: 2020-03-05 | Stop reason: HOSPADM

## 2020-03-03 RX ORDER — LIDOCAINE HYDROCHLORIDE 10 MG/ML
INJECTION, SOLUTION INFILTRATION; PERINEURAL AS NEEDED
Status: DISCONTINUED | OUTPATIENT
Start: 2020-03-03 | End: 2020-03-03 | Stop reason: SURG

## 2020-03-03 RX ORDER — CYCLOSPORINE 0.5 MG/ML
1 EMULSION OPHTHALMIC EVERY 12 HOURS
Status: DISCONTINUED | OUTPATIENT
Start: 2020-03-03 | End: 2020-03-05 | Stop reason: HOSPADM

## 2020-03-03 RX ORDER — DALFAMPRIDINE 10 MG/1
10 TABLET, FILM COATED, EXTENDED RELEASE ORAL
Status: DISCONTINUED | OUTPATIENT
Start: 2020-03-03 | End: 2020-03-05 | Stop reason: HOSPADM

## 2020-03-03 RX ORDER — ZOLPIDEM TARTRATE 5 MG/1
10 TABLET ORAL ONCE
Status: COMPLETED | OUTPATIENT
Start: 2020-03-03 | End: 2020-03-03

## 2020-03-03 RX ORDER — BUPIVACAINE HYDROCHLORIDE AND EPINEPHRINE 5; 5 MG/ML; UG/ML
INJECTION, SOLUTION EPIDURAL; INTRACAUDAL; PERINEURAL AS NEEDED
Status: DISCONTINUED | OUTPATIENT
Start: 2020-03-03 | End: 2020-03-03 | Stop reason: HOSPADM

## 2020-03-03 RX ORDER — NEOSTIGMINE METHYLSULFATE 1 MG/ML
INJECTION INTRAVENOUS AS NEEDED
Status: DISCONTINUED | OUTPATIENT
Start: 2020-03-03 | End: 2020-03-03 | Stop reason: SURG

## 2020-03-03 RX ORDER — TIZANIDINE 4 MG/1
8 TABLET ORAL ONCE
Status: COMPLETED | OUTPATIENT
Start: 2020-03-03 | End: 2020-03-03

## 2020-03-03 RX ORDER — NITROFURANTOIN 25; 75 MG/1; MG/1
100 CAPSULE ORAL
Status: DISCONTINUED | OUTPATIENT
Start: 2020-03-03 | End: 2020-03-03

## 2020-03-03 RX ADMIN — FENTANYL CITRATE 25 MCG: 50 INJECTION, SOLUTION INTRAMUSCULAR; INTRAVENOUS at 16:42

## 2020-03-03 RX ADMIN — MIDAZOLAM HYDROCHLORIDE 2 MG: 1 INJECTION, SOLUTION INTRAMUSCULAR; INTRAVENOUS at 14:14

## 2020-03-03 RX ADMIN — ACETAMINOPHEN 650 MG: 325 TABLET, FILM COATED ORAL at 22:32

## 2020-03-03 RX ADMIN — FENTANYL CITRATE 50 MCG: 50 INJECTION, SOLUTION INTRAMUSCULAR; INTRAVENOUS at 18:55

## 2020-03-03 RX ADMIN — FENTANYL CITRATE 100 MCG: 50 INJECTION, SOLUTION INTRAMUSCULAR; INTRAVENOUS at 14:18

## 2020-03-03 RX ADMIN — CEFAZOLIN 2 G: 10 INJECTION, POWDER, FOR SOLUTION INTRAVENOUS at 14:24

## 2020-03-03 RX ADMIN — MAGNESIUM SULFATE HEPTAHYDRATE 1 G: 500 INJECTION, SOLUTION INTRAMUSCULAR; INTRAVENOUS at 14:55

## 2020-03-03 RX ADMIN — TIZANIDINE 8 MG: 4 TABLET ORAL at 22:31

## 2020-03-03 RX ADMIN — PROPOFOL 200 MG: 10 INJECTION, EMULSION INTRAVENOUS at 14:19

## 2020-03-03 RX ADMIN — PHENYLEPHRINE HYDROCHLORIDE 100 MCG: 10 INJECTION INTRAVENOUS at 14:27

## 2020-03-03 RX ADMIN — ROCURONIUM BROMIDE 40 MG: 10 INJECTION, SOLUTION INTRAVENOUS at 14:19

## 2020-03-03 RX ADMIN — PHENYLEPHRINE HYDROCHLORIDE 100 MCG: 10 INJECTION INTRAVENOUS at 14:30

## 2020-03-03 RX ADMIN — ZOLPIDEM TARTRATE 10 MG: 5 TABLET, COATED ORAL at 22:32

## 2020-03-03 RX ADMIN — ATORVASTATIN CALCIUM 10 MG: 10 TABLET, FILM COATED ORAL at 22:32

## 2020-03-03 RX ADMIN — BACLOFEN 30 MG: 10 TABLET ORAL at 22:32

## 2020-03-03 RX ADMIN — DALFAMPRIDINE 10 MG: 10 TABLET, FILM COATED, EXTENDED RELEASE ORAL at 23:34

## 2020-03-03 RX ADMIN — DEXAMETHASONE SODIUM PHOSPHATE 4 MG: 4 INJECTION, SOLUTION INTRA-ARTICULAR; INTRALESIONAL; INTRAMUSCULAR; INTRAVENOUS; SOFT TISSUE at 14:38

## 2020-03-03 RX ADMIN — CEFAZOLIN SODIUM IN SODIUM CHLORIDE 0.9% IV SOLN 2 GM/100ML 2 G: 2-0.9/1 SOLUTION at 22:34

## 2020-03-03 RX ADMIN — Medication 0.6 MG: at 16:23

## 2020-03-03 RX ADMIN — LIDOCAINE HYDROCHLORIDE 5 ML: 10 INJECTION, SOLUTION INFILTRATION; PERINEURAL at 14:18

## 2020-03-03 RX ADMIN — FENTANYL CITRATE 25 MCG: 50 INJECTION, SOLUTION INTRAMUSCULAR; INTRAVENOUS at 16:44

## 2020-03-03 RX ADMIN — SODIUM CHLORIDE: 900 INJECTION, SOLUTION INTRAVENOUS at 14:10

## 2020-03-03 RX ADMIN — NEOSTIGMINE METHYLSULFATE 4 MG: 1 INJECTION INTRAVENOUS at 16:23

## 2020-03-03 RX ADMIN — FENTANYL CITRATE 50 MCG: 50 INJECTION, SOLUTION INTRAMUSCULAR; INTRAVENOUS at 16:02

## 2020-03-03 RX ADMIN — ONDANSETRON 4 MG: 2 INJECTION INTRAMUSCULAR; INTRAVENOUS at 16:16

## 2020-03-03 RX ADMIN — FENTANYL CITRATE 50 MCG: 50 INJECTION, SOLUTION INTRAMUSCULAR; INTRAVENOUS at 17:45

## 2020-03-03 ASSESSMENT — COGNITIVE AND FUNCTIONAL STATUS - GENERAL
EATING MEALS: 4 - NONE
MOVING TO AND FROM BED TO CHAIR: 4 - NONE
STANDING UP FROM CHAIR USING ARMS: 1 - TOTAL
DRESSING REGULAR LOWER BODY CLOTHING: 4 - NONE
WALKING IN HOSPITAL ROOM: 2 - A LOT
TOILETING: 4 - NONE
HELP NEEDED FOR BATHING: 4 - NONE
DRESSING REGULAR UPPER BODY CLOTHING: 4 - NONE
HELP NEEDED FOR PERSONAL GROOMING: 4 - NONE
CLIMB 3 TO 5 STEPS WITH RAILING: 2 - A LOT

## 2020-03-03 NOTE — ANESTHESIOLOGIST PRE-PROCEDURE ATTESTATION
Pre-Procedure Patient Identification:  I am the Primary Anesthesiologist and have identified the patient on 03/03/20 at 2:14 PM.   I have confirmed the following procedure(s) Right Distal Tibia Open Reduction Internal Fixation, Right Distal Fibula Open Reduction Internal Fixation (R) will be performed by the following surgeon/proceduralist Winston Marcelino MD.

## 2020-03-03 NOTE — ANESTHESIA PROCEDURE NOTES
Airway  Urgency: elective    Start Time: 3/3/2020 2:22 PM    General Information and Staff    Patient location during procedure: OR  Anesthesiologist: Mesha Peoples DO    Indications and Patient Condition  Indications for airway management: anesthesia  Sedation level: deep  Preoxygenated: yes  Patient position: sniffing  Mask difficulty assessment: 2 - vent by mask + OA or adjuvant +/- NMBA    Final Airway Details  Final airway type: endotracheal airway      Successful airway: ETT  Cuffed: yes   Successful intubation technique: direct laryngoscopy  Facilitating devices/methods: intubating stylet  Endotracheal tube insertion site: oral  Blade: Otilia  Blade size: #3  ETT size (mm): 7.0  Cormack-Lehane Classification: grade IIa - partial view of glottis  Placement verified by: chest auscultation and capnometry   Measured from: lips  ETT to lips (cm): 22  Number of attempts at approach: 1  Ventilation between attempts: none  Number of other approaches attempted: 0  Atraumatic airway insertion

## 2020-03-03 NOTE — OR SURGEON
Pre-Procedure patient identification:  I am the primary operating surgeon/proceduralist and I have identified the patient on 03/03/20 at 1:54 PM Winston Marcelino MD  Phone Number: [phone]

## 2020-03-03 NOTE — ANESTHESIA PREPROCEDURE EVALUATION
Relevant Problems   No relevant active problems   multiple sclerosis    Anesthesia ROS/MED HX      Cardiovascular   ECG reviewed   Normal ECG         Past Surgical History:   Procedure Laterality Date   • COLONOSCOPY     • EYE SURGERY Bilateral     cataracts   • HYSTERECTOMY     • ORIF PROXIMAL TIBIAL PLATEAU FRACTURE Right    • ORIF TIBIA FRACTURE Left     and fibula       Physical Exam    Airway   Mallampati: II   TM distance: >3 FB   Neck ROM: full  Cardiovascular - normal   Rhythm: regular   Rate: normalPulmonary - normal   clear to auscultation  Dental - normal        Anesthesia Plan    Plan: general    Technique: general endotracheal     Airway: oral intubation       patient did not smoke on day of surgery  ASA 3  Anesthetic plan and risks discussed with: patient  Induction:    intravenous   Postop Plan:   Patient Disposition: phase II then home   Pain Management: IV analgesics  Comments:    Plan: Pt requested magesium IV for muscle cramps.  States she had during previous surgery.  Checked dosage with pharmacist, ok to proceed.

## 2020-03-03 NOTE — OP NOTE
PREOPERATIVE DIAGNOSIS:   (1) Right Distal Tibia Fracture  (2) Right Distal Fibula Fracture     POSTOPERATIVE DIAGNOSIS:  (1) Right Distal Tibia Fracture  (2) Right Distal Fibula Fracture     PROCEDURE:     (1) Right Distal Tibia ORIF  (2) Right Distal Fibula ORIF     SURGEON:       LAURA ANNA M.D.     ANESTHESIA:     General    ESTIMATED BLOOD LOSS:     Minimal     COMPLICATIONS:     None    IMPLANTS:      Synthes Distal Tibia & Fibular locking plates    INDICATIONS FOR SURGERY:  The patient presented with a displaced distal TibFib fracture. We discussed treatment options. Surgical repair was offered to regain ambulatory ability, transfer ability, and pain control. Risks of the fracture and surgery include bleeding, infection, nerve injury, tendon injury, nonunion, malunion, stiffness, weakness, hardware failure, leg length discrepancy, malrotation, and pain. In particular, it was stressed that this injury is prone to post-traumatic arthritis. Risks of the surgery and anesthesia were also discussed and include bleeding, infection, DVT or clot formation, organ dysfunction, cardiopulmonary compromise, and loss of life and limb. The patient and family understood the risks, operative plans, rehabilitation, and consented willingly to surgery.     DESCRIPTION OF PROCEDURE:  The patient was brought to the operating room and identified by the surgical staff. The operative limb was also identified by the surgical staff. Antibiotics were confirmed delivered. The patient was anesthetized by the anesthesiology staff. The patient was positioned in the standard fashion making sure to pad all bony prominences. The lower extremity was then prepped and draped in standard orthopaedic sterile fashion.     First the fibula was approached. A straight incision was placed over the fibula. The superficial peroneal nerve was retracted and the fibula and fracture was exposed, reduced, and fixed with a locking plate.    Second the tibia  was approached. A straight incision was placed over the tibia medially. The saphenous vein and nerve was retracted and the tibia and fracture was exposed, reduced, and fixed with a locking plate.    Once satisfied, the wound was washed and closed in layers. A sterile dressing was applied. The patient was awoken from anesthesia without difficulty and taken to the recovery room in stable condition.     I was present for all critical portions of the case.

## 2020-03-03 NOTE — BRIEF OP NOTE
Right Distal Tibia Open Reduction Internal Fixation, Right Distal Fibula Open Reduction Internal Fixation (R) Procedure Note    Procedure:    Right Distal Tibia Open Reduction Internal Fixation, Right Distal Fibula Open Reduction Internal Fixation  CPT(R) Code:  38732 - [place] OPEN TREATMENT FRACTURE DISTAL TIBIA & FIBULA      Pre-op Diagnosis     * Closed fracture of distal end of right tibia, unspecified fracture morphology, initial encounter [S82.301A]     * Closed fracture of distal end of right fibula, unspecified fracture morphology, initial encounter [S82.831A]       Post-op Diagnosis     * Closed fracture of distal end of right tibia, unspecified fracture morphology, initial encounter [S82.301A]     * Closed fracture of distal end of right fibula, unspecified fracture morphology, initial encounter [S82.831A]    Surgeon(s) and Role:     * Winston Marcelino MD - Primary    Anesthesia: Choice    Staff:   Circulator: Kacey Versa RN; Shanae Zuleta RN  Scrub Person: Gabi Castro RN    Procedure Details   See full Op Report    Estimated Blood Loss: No blood loss documented.    Specimens:                No specimens collected during this procedure.      Drains: * No LDAs found *    Implants:   Implant Name Type Inv. Item Serial No.  Lot No. LRB No. Used   PLATE 2.7MM VA-LCP LATERAL DISTAL FIBULA  6 HOLES RIGHT - [id number]  PLATE 2.7MM VA-LCP LATERAL DISTAL FIBULA  6 HOLES RIGHT  [employer]  Right 1   PLATE DISTAL MEDIAL LCP 6 HOLE - [id number] Bone plate PLATE DISTAL MEDIAL LCP 6 HOLE  [employer]OR[employer]  Right 1   SCREW CORTEX 2.7MM X 12MM - [id number]  SCREW CORTEX 2.7MM X 12MM  [employer]OR[employer]  Right 1   SCREW METAPHYSEAL 2.7 X 26MM - [id number]  SCREW METAPHYSEAL 2.7 X 26MM  [employer]OR[employer]O  Right 1   SCREW 3.5MM CORTEX 24MM - [id number] Bone screw SCREW 3.5MM CORTEX 24MM  [employer]OR[employer]  Right 1   SCREW 3.5MM CORTEX 30MM - [id number] Bone screw SCREW 3.5MM CORTEX 30MM   [employer]OR[employer]O  Right 1   SCREW LOCKING 2.7 X 20MM - [id number] Bone screw SCREW LOCKING 2.7 X 20MM  [employer]OR[employer]O  Right 3   SCREW LOCKING 2.7 X 12MM - [id number] Bone screw SCREW LOCKING 2.7 X 12MM  [employer]OR[employer]O  Right 3   SCREW LOCKING 2.7 X 14MM - [id number] Bone screw SCREW LOCKING 2.7 X 14MM  [employer]OR[employer]O  Right 1   SCREW LOCKING 2.7 X 16MM - [id number] Bone screw SCREW LOCKING 2.7 X 16MM  [employer]UMA[employer]ORTHO  Right 2   SCREW VA LOCKING 2.7X18MM - [id number] Bone screw SCREW VA LOCKING 2.7X18MM  [employer]OR[employer]O  Right 1   SCREW LOCKING 2.7 X 28MM - [id number] Bone screw SCREW LOCKING 2.7 X 28MM  SY[employer]ES TRAUMA/ORTHO  Right 1   SCREW LOCKING 2.7 X 30MM - [id number] Bone screw SCREW LOCKING 2.7 X 30MM  SYNTHES TRAUMA/ORTHO  Right 1   SCREW LOCKING 2.7 X 34MM - [id number] Bone screw SCREW LOCKING 2.7 X 34MM  SYNTHES TRAUMA/ORTHO  Right 1   SCREW LOCKING 2.7 X 36MM - [id number] Bone screw SCREW LOCKING 2.7 X 36MM  SYNTHES TRAUMA/ORTHO  Right 1   SCREW LOCKING 2.7 X 38MM - [id number] Bone screw SCREW LOCKING 2.7 X 38MM  SYNTHES TRAUMA/ORTHO  Right 2   SCREW LOCKING VA 3.5MM X 24MM - [id number]  SCREW LOCKING VA 3.5MM X 24MM  SYNTHES TRAUMA/ORTHO  Right 1   SCREW LOCKING 3.5 X 26 - [id number] Bone screw SCREW LOCKING 3.5 X 26  SY[employer]ES TRAUMA/ORTHO  Right 2              Complications:  None; patient tolerated the procedure well.           Disposition: PACU - hemodynamically stable.           Condition: stable    Winston Marcelino MD  Phone Number: [phone]

## 2020-03-04 PROBLEM — E78.5 DYSLIPIDEMIA: Status: ACTIVE | Noted: 2020-03-04

## 2020-03-04 PROBLEM — G35 MS (MULTIPLE SCLEROSIS) (CMS/HCC): Status: ACTIVE | Noted: 2020-03-04

## 2020-03-04 PROBLEM — R55 VASOVAGAL SYNCOPE: Status: ACTIVE | Noted: 2020-03-04

## 2020-03-04 LAB
ANION GAP SERPL CALC-SCNC: 11 MEQ/L (ref 3–15)
BUN SERPL-MCNC: 14 MG/DL (ref 8–20)
CALCIUM SERPL-MCNC: 9.4 MG/DL (ref 8.9–10.3)
CHLORIDE SERPL-SCNC: 103 MEQ/L (ref 98–109)
CO2 SERPL-SCNC: 25 MEQ/L (ref 22–32)
CREAT SERPL-MCNC: 0.8 MG/DL (ref 0.6–1.1)
ERYTHROCYTE [DISTWIDTH] IN BLOOD BY AUTOMATED COUNT: 14 % (ref 11.7–14.4)
GFR SERPL CREATININE-BSD FRML MDRD: >60 ML/MIN/1.73M*2
GLUCOSE SERPL-MCNC: 89 MG/DL (ref 70–99)
HCT VFR BLDCO AUTO: 32.6 % (ref 35–45)
HGB BLD-MCNC: 10.4 G/DL (ref 11.8–15.7)
MCH RBC QN AUTO: 28.7 PG (ref 28–33.2)
MCHC RBC AUTO-ENTMCNC: 31.9 G/DL (ref 32.2–35.5)
MCV RBC AUTO: 89.8 FL (ref 83–98)
PDW BLD AUTO: 10 FL (ref 9.4–12.3)
PLATELET # BLD AUTO: 370 K/UL (ref 150–369)
POTASSIUM SERPL-SCNC: 3.7 MEQ/L (ref 3.6–5.1)
RBC # BLD AUTO: 3.63 M/UL (ref 3.93–5.22)
SODIUM SERPL-SCNC: 139 MEQ/L (ref 136–144)
WBC # BLD AUTO: 8 K/UL (ref 3.8–10.5)

## 2020-03-04 PROCEDURE — 80048 BASIC METABOLIC PNL TOTAL CA: CPT | Performed by: ORTHOPAEDIC SURGERY

## 2020-03-04 PROCEDURE — 97163 PT EVAL HIGH COMPLEX 45 MIN: CPT

## 2020-03-04 PROCEDURE — 97167 OT EVAL HIGH COMPLEX 60 MIN: CPT | Mod: GO

## 2020-03-04 PROCEDURE — 63700000 HC SELF-ADMINISTRABLE DRUG: Performed by: STUDENT IN AN ORGANIZED HEALTH CARE EDUCATION/TRAINING PROGRAM

## 2020-03-04 PROCEDURE — 99253 IP/OBS CNSLTJ NEW/EST LOW 45: CPT | Performed by: HOSPITALIST

## 2020-03-04 PROCEDURE — 63600000 HC DRUGS/DETAIL CODE: Performed by: ORTHOPAEDIC SURGERY

## 2020-03-04 PROCEDURE — 85027 COMPLETE CBC AUTOMATED: CPT | Performed by: ORTHOPAEDIC SURGERY

## 2020-03-04 PROCEDURE — 12000000 HC ROOM AND CARE MED/SURG

## 2020-03-04 PROCEDURE — 36415 COLL VENOUS BLD VENIPUNCTURE: CPT | Performed by: ORTHOPAEDIC SURGERY

## 2020-03-04 PROCEDURE — 63700000 HC SELF-ADMINISTRABLE DRUG: Performed by: ORTHOPAEDIC SURGERY

## 2020-03-04 RX ORDER — ZOLPIDEM TARTRATE 5 MG/1
10 TABLET ORAL NIGHTLY PRN
Status: DISCONTINUED | OUTPATIENT
Start: 2020-03-04 | End: 2020-03-05 | Stop reason: HOSPADM

## 2020-03-04 RX ORDER — TIZANIDINE 4 MG/1
8 TABLET ORAL NIGHTLY PRN
Status: DISCONTINUED | OUTPATIENT
Start: 2020-03-04 | End: 2020-03-05 | Stop reason: HOSPADM

## 2020-03-04 RX ADMIN — BACLOFEN 30 MG: 10 TABLET ORAL at 11:43

## 2020-03-04 RX ADMIN — CEFAZOLIN SODIUM IN SODIUM CHLORIDE 0.9% IV SOLN 2 GM/100ML 2 G: 2-0.9/1 SOLUTION at 06:54

## 2020-03-04 RX ADMIN — BACLOFEN 30 MG: 10 TABLET ORAL at 08:50

## 2020-03-04 RX ADMIN — ZOLPIDEM TARTRATE 10 MG: 5 TABLET, COATED ORAL at 22:53

## 2020-03-04 RX ADMIN — BACLOFEN 30 MG: 10 TABLET ORAL at 16:14

## 2020-03-04 RX ADMIN — TIZANIDINE 8 MG: 4 TABLET ORAL at 22:53

## 2020-03-04 RX ADMIN — ENOXAPARIN SODIUM 40 MG: 40 INJECTION SUBCUTANEOUS at 06:54

## 2020-03-04 RX ADMIN — DALFAMPRIDINE 10 MG: 10 TABLET, FILM COATED, EXTENDED RELEASE ORAL at 08:56

## 2020-03-04 RX ADMIN — ACETAMINOPHEN 650 MG: 325 TABLET, FILM COATED ORAL at 20:34

## 2020-03-04 RX ADMIN — ATORVASTATIN CALCIUM 10 MG: 10 TABLET, FILM COATED ORAL at 22:03

## 2020-03-04 RX ADMIN — OXYBUTYNIN 15 MG: 15 TABLET, FILM COATED, EXTENDED RELEASE ORAL at 08:55

## 2020-03-04 RX ADMIN — CYCLOSPORINE 1 DROP: 0.5 EMULSION OPHTHALMIC at 20:26

## 2020-03-04 RX ADMIN — CYCLOSPORINE 1 DROP: 0.5 EMULSION OPHTHALMIC at 08:55

## 2020-03-04 RX ADMIN — ACETAMINOPHEN 650 MG: 325 TABLET, FILM COATED ORAL at 08:50

## 2020-03-04 RX ADMIN — DALFAMPRIDINE 10 MG: 10 TABLET, FILM COATED, EXTENDED RELEASE ORAL at 20:26

## 2020-03-04 RX ADMIN — OXYCODONE HYDROCHLORIDE 10 MG: 5 TABLET ORAL at 08:51

## 2020-03-04 RX ADMIN — BACLOFEN 30 MG: 10 TABLET ORAL at 20:24

## 2020-03-04 RX ADMIN — BUTALBITAL, ACETAMINOPHEN AND CAFFEINE 1 TABLET: 50; 325; 40 TABLET ORAL at 05:40

## 2020-03-04 RX ADMIN — ACETAMINOPHEN 650 MG: 325 TABLET, FILM COATED ORAL at 03:37

## 2020-03-04 ASSESSMENT — COGNITIVE AND FUNCTIONAL STATUS - GENERAL
STANDING UP FROM CHAIR USING ARMS: 2 - A LOT
HELP NEEDED FOR BATHING: 2 - A LOT
AFFECT: WFL
DRESSING REGULAR LOWER BODY CLOTHING: 3 - A LITTLE
DRESSING REGULAR UPPER BODY CLOTHING: 3 - A LITTLE
HELP NEEDED FOR PERSONAL GROOMING: 3 - A LITTLE
CLIMB 3 TO 5 STEPS WITH RAILING: 1 - TOTAL
WALKING IN HOSPITAL ROOM: 1 - TOTAL
AFFECT: WFL
MOVING TO AND FROM BED TO CHAIR: 2 - A LOT
TOILETING: 3 - A LITTLE
EATING MEALS: 4 - NONE

## 2020-03-04 NOTE — PLAN OF CARE
Problem: Adult Inpatient Plan of Care  Goal: Readiness for Transition of Care  Outcome: Progressing     Problem: Adult Inpatient Plan of Care  Goal: Readiness for Transition of Care  Intervention: Mutually Develop Transition Plan  Flowsheets (Taken 3/4/2020 6223)  Anticipated Discharge Disposition: home with home health services; home with assistance  Equipment Needed After Discharge: wheelchair, manual; wheelchair, power; shower chair; raised toilet  Equipment Currently Used at Home: wheelchair; raised toilet; walker, josefina; shower chair; other (see comments) (electric wheelchair)  Concerns Comments: Independent with adls prior to admission. Discharged from [facility] in February. Has been home since then. After discharge from [place] had private pay HHA when needed. Lives with . Plans to return home at discharge.  Transportation Concerns: car, none  Current Discharge Risk: dependent with mobility/activities of daily living  Readmission Within the Last 30 Days: no previous admission in last 30 days  Patient/Family Anticipated Services at Transition: home health care  Patient/Family Anticipates Transition to: home with family (Plans to return home with assist of  and private pay HHA.)  Transportation Anticipated: family or friend will provide  Concerns to be Addressed: no discharge needs identified; denies needs/concerns at this time

## 2020-03-04 NOTE — HOSPITAL COURSE
Sonal is a 60 y.o. female admitted on 3/3/2020 with Tibia/fibula fracture    Sonal has a past medical history of Bladder dysfunction, Classic migraine, Closed fracture of right lower leg, Constipation, Lipid disorder, MS (multiple sclerosis) (CMS/HCC), Muscle spasm of both lower legs, Numbness, Osteoporosis, PONV (postoperative nausea and vomiting), Self-catheterizes urinary bladder, and Wheelchair bound.    History of Present Illness    S/P R distal tib/fib ORIF, 3/3, nonWB RLE    Patient reports in December she fractured her tibia plateau- was DC to [facility] to be trained on WC transfers; patient has been home and able to safely transfer into her WC; pt has been using her electric WB in the community  Prior to December, patient ambulated using a tripod walker

## 2020-03-04 NOTE — ASSESSMENT & PLAN NOTE
"Had a vasovagal episode yesterday.  No further episodes.  No lightheadedness or dizziness.  No nausea or vomiting.  No chest pains or shortness of breath.  She is completely asymptomatic.  She did not wear the heart monitor because it did not seem to be working properly- saying there was a lot of \"static \"  "

## 2020-03-04 NOTE — PROGRESS NOTES
Orthopaedic Surgery Progress Note    CARROLLEON. Doing well with pain well controlled. Endorses spasms 2/2 MS. Otherwise denies chest pain, SOB, fever, chills.     Objective:    Vitals:    03/04/20 0330   BP: (!) 143/76   Pulse: 81   Resp: 16   Temp: 36.7 °C (98 °F)   SpO2: 97%       RLE  Splint c/d/i  Appropriate swelling  +EHL/FHL  SILT SPN DPN Plantar  Digits WWP with good cap refill    Assessment and Plan:  60 y.o. female s/p R DistalTibia/Fibula ORIF. Doing well with pain well controlled    -- NWB RLE  -- young for urinary retention, to be d/c upon discharge  -- DVT PPX: lovneox 40  -- PT/OT    Audie Rosas

## 2020-03-04 NOTE — NURSING NOTE
A syncopal event at 10:50 am when PT/OT got patient up out of bed and into recliner.  She was laid flat in recliner and quickly came to and talking.  BP from 85/55 P62 to 131/75 P-83.  Ammonia spirit utilized, cool cloth behind neck and Bolus 500 ccs NS given.  Itz IVY notified.  [facility] consulted. Pt felt much better in 10 minutes after event, sitting up in recliner chair.

## 2020-03-04 NOTE — ANESTHESIA POSTPROCEDURE EVALUATION
Patient: Sonal Alejandra    Procedure Summary     Date:  03/03/20 Room / Location:  [facility] PAV OR 06 / [facility] OR [facility]    Anesthesia Start:  1411 Anesthesia Stop:  1709    Procedure:  Right Distal Tibia Open Reduction Internal Fixation, Right Distal Fibula Open Reduction Internal Fixation (Right Leg Lower) Diagnosis:       Closed fracture of distal end of right tibia, unspecified fracture morphology, initial encounter      Closed fracture of distal end of right fibula, unspecified fracture morphology, initial encounter      (Fx Dis Tibial, Fx Dis Fibula)    Surgeon:  Winston Marcelino MD Responsible Provider:  Dylan Blue MD    Anesthesia Type:  general ASA Status:  3          Anesthesia Type: general  PACU Vitals  3/3/2020 1659 - 3/3/2020 1759      3/3/2020  1709 3/3/2020  1715 3/3/2020  1730 3/3/2020  1745    BP:  137/75  (!) 147/68  (!) 149/72  (!) 151/67    Temp:  36.6 °C (97.9 °F)  --  --  --    Pulse:  83  81  72  77    Resp:  14  (!) 28  16  16    SpO2:  100 %  100 %  100 %  100 %            Anesthesia Post Evaluation    Pain management: adequate  Patient participation: complete - patient participated  Level of consciousness: awake and alert  Cardiovascular status: acceptable  Airway Patency: adequate  Respiratory status: acceptable  Hydration status: acceptable  Anesthetic complications: no

## 2020-03-04 NOTE — PROGRESS NOTES
Patient: Sonal Alejandra  Location: [facility] 5PAV 5414  MRN: [id number]  Today's date: 3/4/2020     Patient returned to seated position in recliner, NAD, call bell and hospital phone within reach, chair alarm activated, nsg notified     Sonal is a 60 y.o. female admitted on 3/3/2020 with Tibia/fibula fracture    Sonal has a past medical history of Bladder dysfunction, Classic migraine, Closed fracture of right lower leg, Constipation, Lipid disorder, MS (multiple sclerosis) (CMS/HCC), Muscle spasm of both lower legs, Numbness, Osteoporosis, PONV (postoperative nausea and vomiting), Self-catheterizes urinary bladder, and Wheelchair bound.    History of Present Illness    S/P R distal tib/fib ORIF, 3/3, nonWB RLE    Patient reports in December she fractured her tibia plateau- was DC to [facility] to be trained on  transfers; patient has been home and able to safely transfer into her WC; pt has been using her electric WB in the community  Prior to December, patient ambulated using a tripod walker       PT Vitals    Date/Time Pulse SpO2 Pt Activity O2 Therapy BP MAP BP Location BP Method Pt Position [facility]   03/04/20 1055 90 96 % At rest None (Room air) 144/68 -- -- Automatic Lying [place]   03/04/20 1056 92 97 % At rest None (Room air) 85/55 -- -- Automatic Sitting LA   03/04/20 1100 92 -- -- -- 85/55 65 mmHg Left upper arm Automatic Lying RD   03/04/20 1115 83 -- -- -- 131/75 97 mmHg Left upper arm Automatic Sitting RD      PT Pain    Date/Time Pain Type Pref Pain Scale Side Location Rating: Rest Rating: Activity Interventions [facility]   03/04/20 1055 Pain Assessment number (Numeric Rating Pain Scale) Right leg 6 6 position adjusted LA          Prior Living Environment      Most Recent Value   Living Arrangements  condominium   Living Environment Comment  lives with spouse, condo, no steps, elevator access          Prior Level of Function      Most Recent Value   Ambulation  other (see comments)   Transferring   assistive equipment   Toileting  independent   Bathing  independent   Dressing  independent   Eating  independent   Prior Level of Function Comment  Patient reports recent right tibial plateau fx in Decemeber- pt underwent rehab at [facility] and learned how to transfer using a RW into a WC and use a WC for mobility,  pt was then DC home and able to transfer using a RW into her WC, uses an electric WC outside and manual inside her home, independent for ADLs, prior to December pt ambulates using a tripod walker due to hx of MS   Equipment Currently Used at Home  wheelchair, walker, front-wheeled, other (see comments) [commode]          PT Evaluation and Treatment - 03/04/20 1055        Time Calculation    Start Time  1055     Stop Time  1115     Time Calculation (min)  20 min        Session Details    Document Type  initial evaluation     Mode of Treatment  physical therapy        General Information    Patient Profile Reviewed?  yes     Onset of Illness/Injury or Date of Surgery  03/03/20     Referring Physician  Rsuty     General Observations of Patient  Pt received supine in bed     Existing Precautions/Restrictions  fall;weight bearing;cardiac        Weight-Bearing Status    Right LE Weight-Bearing Status  non weight-bearing (NWB)        Cognition/Psychosocial    Affect/Mental Status (Cognitive)  WFL     Orientation Status (Cognition)  oriented x 4     Follows Commands (Cognition)  follows multi-step commands        Sensory Assessment (Somatosensory)    Sensory Assessment (Somatosensory)  LE sensation intact        Range of Motion (ROM)    Range of Motion  ROM is WFL;bilateral lower extremities        Strength (Manual Muscle Testing)    Strength (Manual Muscle Testing)  strength is WFL;bilateral lower extremities        Bed Mobility    Bed Mobility  supine to sit to supine     [place], Supine to Sit  1 person assist;minimum assist (75% patient effort)     Assistive Device (Bed Mobility)  head of bed elevated      Comment (Bed Mobility)  assist with management of RLE        Transfers    Transfers  stand pivot/stand step transfer     Maintains Weight-Bearing Status (Transfers)  cues to maintain weight-bearing status        Sit to Stand Transfer    [place], Sit to Stand Transfer  2 person assist;minimum assist (75% patient effort);verbal cues     Verbal Cues  safety;proper use of assistive device     Assistive Device  walker, front-wheeled     Comment  cues for hand placement        Stand to Sit Transfer    [place], Stand to Sit Transfer  2 person assist;minimum assist (75% patient effort);verbal cues     Verbal Cues  safety;proper use of assistive device     Assistive Device  walker, front-wheeled     Comment  cues for hand placement        Stand Pivot/Stand Step Transfer    [place], Stand Pivot/Stand Step Transfer  2 person assist;minimum assist (75% or more patient effort)     Assistive Device  walker, front-wheeled     Comment  cues for pivoting on LLE, difficulty maintaining balance, post transfer pt became dizzy-unresponsive for <10sec, legs elevated, BP drop, nsg called, resolved symptoms with legs elevated        Gait Training    [place], Gait  unable to assess     Assistive Device  --    pt unable to hop on LLE       Stairs Training    [place], Stairs  other (see comments)    no steps- condo       Balance    Balance Assessment  sitting static balance     Static Sitting Balance  WFL;sitting, edge of bed        AM-PAC (TM) - Mobility (Current Function)    Turning from your back to your side while in a flat bed without using bedrails?  3 - A Little     Moving from lying on your back to sitting on the side of a flat bed without using bedrails?  3 - A Little     Moving to and from a bed to a chair?  2 - A Lot     Standing up from a chair using your arms?  2 - A Lot     To walk in a hospital room?  1 - Total     Climbing 3-5 steps with a railing?  1 - Total     AM-PAC (TM) Mobility Score  12         Therapy Assessment/Plan (PT)    Rehab Potential (PT)  good, to achieve stated therapy goals     Therapy Frequency (PT)  5-7 times/wk     Criteria for Skilled Interventions Met (PT)  yes     Problem List  mobility;pain     Activity Limitations Related to Problem List  home management activity not performed adequately or safely        Progress Summary (PT)    Daily Outcome Statement (PT)  3/4 Pt is a 60 year old female s/p R distal tib/fib ORIF post op day one; prior to current surgery, pt fractured her tibia plateau in December and has been nonWB in RLE, pt has been transferring into her WC and able to properly manage her WC her in the community; melva was orthostatic on eval, PT to follow up tomorrow to reassess transfers; anticipate DC home with assist and home health     Symptoms Noted During/After Treatment  fatigue;dizziness        Therapy Plan Review/Discharge Plan (PT)    PT Recommended Discharge Disposition  home with assist;home with home health     Anticipated Equipment Needs at Discharge (PT Eval)  none        Plan of Care Review    Plan of Care Reviewed With  patient                       Education provided this session. See the Patient Education summary report for full details.    PT Goals      Most Recent Value   Bed Mobility Goal 1   Activity/Assistive Device  bed mobility activities, all at 03/04/2020 1055   [place]  independent at 03/04/2020 1055   Time Frame  short-term goal (STG) at 03/04/2020 1055   Progress/Outcome  goal ongoing at 03/04/2020 1055   Transfer Goal 1   Activity/Assistive Device  bed-to-chair/chair-to-bed, wheelchair transfer, walker, front-wheeled at 03/04/2020 1055   [place]  modified independence at 03/04/2020 1055   Time Frame  short-term goal (STG) at 03/04/2020 1055   Progress/Outcome  goal ongoing at 03/04/2020 1055

## 2020-03-04 NOTE — PATIENT CARE CONFERENCE
Care Progression Rounds Note  Date: 3/4/2020  Time: 10:10 AM     Patient Name: Sonal Alejandra     Medical Record Number: [id number]   YOB: 1960  Sex: Female      Room/Bed: 5414    Admitting Diagnosis: Closed fracture of distal end of right tibia, unspecified fracture morphology, initial encounter [S82.301A]  Closed fracture of distal end of right fibula, unspecified fracture morphology, initial encounter [S82.831A]  Tibia/fibula fracture [S82.209A, S82.409A]   Admit Date/Time: 3/3/2020 11:40 AM    Primary Diagnosis: No Principal Problem: There is no principal problem currently on the Problem List. Please update the Problem List and refresh.  Principal Problem: No Principal Problem: There is no principal problem currently on the Problem List. Please update the Problem List and refresh.    GMLOS: pending  Anticipated Discharge Date: 3/5/2020    AM-PAC  Mobility Score: 17    Discharge Planning:       Barriers to Discharge:  Barriers to Discharge: Medical issues not resolved    Participants:  advanced practice provider, , nursing, physical therapy, social work/services

## 2020-03-04 NOTE — PLAN OF CARE
Problem: Adult Inpatient Plan of Care  Goal: Plan of Care Review  Outcome: Progressing  Flowsheets (Taken 3/4/2020 9688)  Progress: improving  Plan of Care Reviewed With: patient  Outcome Summary: PT yuly completed

## 2020-03-04 NOTE — PROGRESS NOTES
Patient: Sonal Alejandra  Location: [facility] 5PAV 5414  MRN: [id number]  Today's date: 3/4/2020    Patient returned OOB seated in recliner chair with RLE elevated, tray, call bell and personal items accessible. NAD. RN present. Chair alarm activated.     Sonal is a 60 y.o. female admitted on 3/3/2020 with Tibia/fibula fracture    Sonal has a past medical history of Bladder dysfunction, Classic migraine, Closed fracture of right lower leg, Constipation, Lipid disorder, MS (multiple sclerosis) (CMS/HCC), Muscle spasm of both lower legs, Numbness, Osteoporosis, PONV (postoperative nausea and vomiting), Self-catheterizes urinary bladder, and Wheelchair bound.    History of Present Illness    S/P R distal tib/fib ORIF, 3/3, nonWB RLE    Patient reports in December she fractured her tibia plateau- was [place] [facility] [place] trained on  transfers; patient has been home and able to safely transfer into her WC; pt has been using her electric WB in the community  Prior to December, patient ambulated using a tripod walker       OT Vitals    Date/Time Pulse SpO2 Pt Activity O2 Therapy BP MAP BP Location BP Method Pt Position [facility]   03/04/20 1055 90 96 % At rest None (Room air) 144/68 -- -- Automatic Lying LA   03/04/20 1056 92 97 % At rest None (Room air) 85/55 -- -- Automatic Sitting LA   03/04/20 1100 92 -- -- -- 85/55 65 mmHg Left upper arm Automatic Lying RD      OT Pain    Date/Time Pain Type Pref Pain Scale Side Location Rating: Rest Rating: Activity Interventions [facility]   03/04/20 1055 Pain Assessment number (Numeric Rating Pain Scale) Right leg 6 6 position adjusted LA          Prior Living Environment      Most Recent Value   Living Arrangements  condominium   Living Environment Comment  lives with spouse, condo, no steps, elevator access          Prior Level of Function      Most Recent Value   Ambulation  other (see comments)   Transferring  assistive equipment   Toileting  independent   Bathing   independent   Dressing  independent   Eating  independent   Prior Level of Function Comment  Patient reports recent right tibial plateau fx in Decemeber- pt underwent rehab at [facility] [place] and learned how to transfer using a RW into a WC and use a WC for mobility,  pt was then DC home and able to transfer using a RW into her WC, uses an electric WC outside and manual inside her home, independent for ADLs, prior to December pt ambulates using a tripod walker due to hx of MS   Equipment Currently Used at Home  wheelchair, walker, front-wheeled, other (see comments) [commode]          OT Evaluation and Treatment - 03/04/20 1054        Time Calculation    Start Time  1054     Stop Time  1114     Time Calculation (min)  20 min        Session Details    Document Type  initial evaluation     Mode of Treatment  occupational therapy        General Information    Patient Profile Reviewed?  yes     Onset of Illness/Injury or Date of Surgery  03/02/20     Referring Physician  Chari     General Observations of Patient  Pt received supine in bed, HOB elevated, RLE elevated on pillows, awake & alert, agreeable to OT      Existing Precautions/Restrictions  fall;weight bearing;cardiac        Weight-Bearing Status    Right LE Weight-Bearing Status  non weight-bearing (NWB)        Occupational Profile    Reason for Services/Referral (Occupational Profile)  R tibia/fibula fracture, decline in ADLs     Occupational History/Life Experiences (Occupational Profile)  h/o MS     Environmental Supports and Barriers (Occupational Profile)  supportive , HHA available during the day     Patient Goals (Occupational Profile)  to return home        Cognition/Psychosocial    Affect/Mental Status (Cognitive)  WFL     Orientation Status (Cognition)  oriented x 4     Follows Commands (Cognition)  WFL;follows multi-step commands     Comment, Cognition  demo's good safety awareness        Sensory Assessment (Somatosensory)    Sensory Assessment  (Somatosensory)  UE sensation intact        Range of Motion (ROM)    Range of Motion  ROM is WFL;bilateral upper extremities        Strength (Manual Muscle Testing)    Strength (Manual Muscle Testing)  strength is WFL;bilateral upper extremities        Bed Mobility    [place], Supine to Sit  minimum assist (75% patient effort);1 person assist     Assistive Device (Bed Mobility)  head of bed elevated;bed rails     Comment (Bed Mobility)  assist with RLE        Transfers    Transfers  stand pivot/stand step transfer     Maintains Weight-Bearing Status (Transfers)  cues to maintain weight-bearing status        Sit to Stand Transfer    [place], Sit to Stand Transfer  2 person assist;minimum assist (75% patient effort);verbal cues     Verbal Cues  safety;proper use of assistive device;hand placement     Assistive Device  walker, front-wheeled     Comment  cues for hand placement, able to maintain NWB RLE        Stand to Sit Transfer    [place], Stand to Sit Transfer  2 person assist;minimum assist (75% patient effort);verbal cues     Verbal Cues  safety;proper use of assistive device;hand placement     Assistive Device  walker, front-wheeled     Comment  cues for hand placement and to control eccentric desent; c/o dizziness/nausea upon sitting, pt unresponsive ~10 seconds, leg elevated, BP dropped, RN called, resolved, RN present        Stand Pivot/Stand Step Transfer    [place], Stand Pivot/Stand Step Transfer  2 person assist;minimum assist (75% or more patient effort)     Assistive Device  walker, front-wheeled     Comment  cues for pivoting        Balance    Balance Assessment  sitting static balance;standing static balance     Static Sitting Balance  WFL;sitting, edge of bed     Static Standing Balance  mild impairment;supported;standing     Comment, Balance  min Ax2 standing balance with RW        Upper Body Dressing    Self-Performance  threads left arm, shirt;threads right arm, shirt      [place]  supervision     Position  edge of bed sitting     Comment  don hospital gown        Lower Body Dressing    Self-Performance  dons/doffs left sock     [place]  minimum assist (75% or more patient effort)     Position  edge of bed sitting        AM-PAC (TM) - ADL (Current Function)    Putting on and taking off regular lower body clothing?  3 - A Little     Bathing?  2 - A Lot     Toileting?  3 - A Little     Putting on/taking off regular upper body clothing?  3 - A Little     How much help for taking care of personal grooming?  3 - A Little     Eating meals?  4 - None     AM-PAC (TM) ADL Score  18        Therapy Assessment/Plan (OT)    Rehab Potential (OT)  good, to achieve stated therapy goals     Therapy Frequency (OT)  3-5 times/wk        Progress Summary (OT)    Daily Outcome Statement (OT)  OT eval completed. Prior to admission, pt NWB RLE, independent transferring and using manual/electric w/c, living with 1st floor setup. Pt required min Ax1 bed mobility and min Ax2 stand pivot bed to chair. Pt orthostatic once seated, RN aware/present. Anticipate pt to progress to be safe for d/c home with spouse and HHA as assist. OT will continue to follow.     Symptoms Noted During/After Treatment  fatigue;dizziness;significant change in vital signs        Therapy Plan Review/Discharge Plan (OT)    OT Recommended Discharge Disposition  home with assist;home with home health     Anticipated Equipment Needs At Discharge (OT)  none    owns necessary DME                  Education provided this session. See the Patient Education summary report for full details.         OT Goals      Most Recent Value   Bed Mobility Goal 1   Activity/Assistive Device  bed mobility activities, all at 03/04/2020 1054   [place]  modified independence at 03/04/2020 1054   Time Frame  5 - 7 days at 03/04/2020 1054   Progress/Outcome  goal ongoing at 03/04/2020 1054   Transfer Goal 1   Activity/Assistive Device   sit-to-stand/stand-to-sit, toilet at 03/04/2020 1054   [place]  modified independence at 03/04/2020 1054   Time Frame  5 - 7 days at 03/04/2020 1054   Progress/Outcome  goal ongoing at 03/04/2020 1054   Dressing Goal 1   Activity/Adaptive Equipment  dressing skills, all at 03/04/2020 1054   [place]  modified independence at 03/04/2020 1054   Time Frame  5 - 7 days at 03/04/2020 1054   Progress/Outcome  goal ongoing at 03/04/2020 1054

## 2020-03-04 NOTE — PLAN OF CARE
Problem: Adult Inpatient Plan of Care  Goal: Plan of Care Review  Outcome: Progressing  Flowsheets (Taken 3/4/2020 5045)  Progress: improving  Plan of Care Reviewed With: patient  Outcome Summary: Patient remains NWB to EM, wheelchair bound prior to surgery. Alex remains in place. Patient had minimal c/o pain, controlled w/ PRN Tylenol. Call bell in reach. Will continue to monitor.

## 2020-03-04 NOTE — PLAN OF CARE
Problem: Adult Inpatient Plan of Care  Goal: Plan of Care Review  Outcome: Progressing  Flowsheets (Taken 3/4/2020 1247)  Progress: improving  Plan of Care Reviewed With: patient  Outcome Summary: OT yuly completed

## 2020-03-04 NOTE — CONSULTS
[facility] Medicine Service -  Inpatient Consultation         Requesting Physician: Dr. Marcelino    Reason for Consultation: syncopal episode     HISTORY OF PRESENT ILLNESS        This is a 60 y.o. female with a history of multiple sclerosis that had a right distal tibia and fibula fracture status post ORIF yesterday.  She reports the fracture occurred around February 22.  She cannot really stand or bear weight.  Today, she was getting up for the first time in over 10 days after having her surgery yesterday and suddenly felt lightheaded.  She felt like she was sweating and felt nauseous.  She was then told that she was unresponsive for a few seconds, though she does not recall actually losing consciousness.  The nurse reports that she did lose consciousness very briefly.  Her blood pressure at the time went to 85 systolic.  She was helped back to bed and given a fluid bolus.  Her blood pressure improved to 130s on the next blood pressure check.  Her symptoms have since resolved completely and she is feeling fine.  She has no nausea or vomiting at this point in time.  She does not feel lightheaded or dizzy.  She is currently sitting in a recliner.  She did not have any chest pains or shortness of breath with this episode or even currently.  She has no known cardiac history.  She has never had a stroke.  She has no new focal muscle weakness.  She has no change in speech or facial droop.    She does take muscle relaxers because she gets really bad spasms.  She did have the muscle relaxers and some pain medicines may be an hour prior to the episode.    She currently has a Alex catheter.  She self catheterizes at home.  She denies any fevers.    PAST MEDICAL AND SURGICAL HISTORY        Past Medical History:   Diagnosis Date   • Bladder dysfunction     r/t ms   • Classic migraine    • Closed fracture of right lower leg     splint in place   • Constipation    • Lipid disorder    • MS (multiple sclerosis) (CMS/Formerly [facility])    •  Muscle spasm of both lower legs    • Numbness     bilat feet   • Osteoporosis    • PONV (postoperative nausea and vomiting)    • Self-catheterizes urinary bladder    • Wheelchair bound      at present secondary to lower extremety fracture       Past Surgical History:   Procedure Laterality Date   • COLONOSCOPY     • EYE SURGERY Bilateral     cataracts   • HYSTERECTOMY     • ORIF PROXIMAL TIBIAL PLATEAU FRACTURE Right    • ORIF TIBIA FRACTURE Left     and fibula       PCP: Pt States, No Pcp    MEDICATIONS        Home Medications:  Medications Prior to Admission   Medication Sig Dispense Refill Last Dose   • BACLOFEN, BULK, MISC 30 mg 4 (four) times a day.   3/3/2020 at 0730   • butalb/acetaminophen/caffeine (FIORICET ORAL) Take by mouth every 6 (six) hours as needed.   3/2/2020 at 2100   • dalfampridine (AMPYRA) 10 mg tablet extended release 12 hr Take 10 mg by mouth every 12 (twelve) hours.   3/2/2020 at 0730   • mirabegron (MYRBETRIQ) 50 mg ER tablet Take 50 mg by mouth daily.   3/3/2020 at 0730   • nitrofurantoin (MACRODANTIN) 100 mg capsule Take 100 mg by mouth every other day.   3/3/2020 at 0730   • ocrelizumab (OCREVUS) 30 mg/mL solution Infuse 600 mg into a venous catheter every 6 (six) months.   2/3/2020 at Unknown time   • oxybutynin chloride (GELNIQUE) 10 % (100 mg/gram) gel in packet Place 100 mg on the skin daily.   3/3/2020 at Unknown time   • oxybutynin XL (DITROPAN XL) 15 mg 24 hr tablet Take 15 mg by mouth daily.   3/3/2020 at 0730   • simvastatin (ZOCOR) 20 mg tablet Take 20 mg by mouth nightly.   3/2/2020 at 2100   • tizanidine HCl (ZANAFLEX ORAL) Take 8 mg by mouth. 8 mg po everynight at bedtime and 4 mg as needed during the night for muscle spasms   3/2/2020 at 2200   • zolpidem (AMBIEN) 10 mg tablet Take 10 mg by mouth nightly.   3/2/2020 at 2200       Current inpatient medications were personally reviewed.    ALLERGIES        Patient has no known allergies.    FAMILY HISTORY        Son has hx  "vasovagal syncope at sight of blood    SOCIAL HISTORY        Social History     Socioeconomic History   • Marital status:      Spouse name: None   • Number of children: None   • Years of education: None   • Highest education level: None   Occupational History   • None   Social Needs   • Financial resource strain: None   • Food insecurity:     Worry: None     Inability: None   • Transportation needs:     Medical: None     Non-medical: None   Tobacco Use   • Smoking status: Never Smoker   • Smokeless tobacco: Never Used   Substance and Sexual Activity   • Alcohol use: Never     Frequency: Never   • Drug use: Never   • Sexual activity: None   Lifestyle   • Physical activity:     Days per week: None     Minutes per session: None   • Stress: None   Relationships   • Social connections:     Talks on phone: None     Gets together: None     Attends [employer] service: None     Active member of club or organization: None     Attends meetings of clubs or organizations: None     Relationship status: None   • Intimate partner violence:     Fear of current or ex partner: None     Emotionally abused: None     Physically abused: None     Forced sexual activity: None   Other Topics Concern   • None   Social History Narrative   • None       REVIEW OF SYSTEMS        All other systems reviewed and negative except as noted in HPI    PHYSICAL EXAMINATION        Visit Vitals  /72 (BP Location: Left upper arm, Patient Position: Sitting)   Pulse 82   Temp 36.8 °C (98.3 °F) (Oral)   Resp 18   Ht 1.651 m (5' 5\")   Wt 55.3 kg (122 lb)   LMP  (LMP Unknown)   SpO2 97%   Breastfeeding No   BMI 20.30 kg/m²     Body mass index is 20.3 kg/m².    Intake/Output Summary (Last 24 hours) at 3/4/2020 1259  Last data filed at 3/4/2020 1118  Gross per 24 hour   Intake 700 ml   Output 4750 ml   Net -4050 ml       Physical Exam  General Appearance:        Awake, Alert, Oriented x3   Head:    Normocephalic, without obvious abnormality, atraumatic "   Eyes:    PERRL, EOM's intact, No icterus   Throat:   Mucous membranes moist   Respiratory:     Clear to auscultation bilaterally   Cardiovascular:    Regular rate and rhythm   GI:     Soft, non-tender, bowel sounds active    Musculoskeletal   Symmetric strength bilateral upper ext   Skin:   No diffuse rash on exposed areas of skin   Neurologic:  Psychiatric   CNII-XII grossly intact.     Appropriate, cooperative       LABS / EKG        Labs  Results from last 7 days   Lab Units 03/04/20  0540   WBC K/uL 8.00   HEMOGLOBIN g/dL 10.4*   HEMATOCRIT % 32.6*   PLATELETS K/uL 370*     Results from last 7 days   Lab Units 03/04/20  0540   SODIUM mEQ/L 139   POTASSIUM mEQ/L 3.7   CHLORIDE mEQ/L 103   CO2 mEQ/L 25   BUN mg/dL 14   CREATININE mg/dL 0.8   GLUCOSE mg/dL 89   CALCIUM mg/dL 9.4         ASSESSMENT AND RECOMMENDATIONS           Tibia/fibula fracture  Assessment & Plan  S/p Right Distal Tibia and Fibula ORIF  Continue PT/OT  Encourage IS  Continue Bowel regimen  DVT prophylaxis and pain meds per ortho      Vasovagal syncope  Assessment & Plan  The patient had an episode earlier today where she felt lightheaded, sweating, and nauseous when she first tried to stand up.  Is the first time she was trying to stand up since after surgery and in the past 10+ days.  Her blood pressure dropped into the 80s.  She was helped back to bed and her symptoms resolved very quickly and her blood pressure improved very quickly.  She has no symptoms at this time.  She feels fine and back to her baseline.  She also had some pain medicines and muscle relaxers about an hour prior.  This sounds like a classic vasovagal episode.  Encourage po fluids.  To monitor blood pressure.  Will order telemetry monitor to look for any potential arrhythmias.    Dyslipidemia  Assessment & Plan  Continue statin    MS (multiple sclerosis) (CMS/Formerly [facility])  Assessment & Plan  Follow-up with neurology.  Continue usual medications.         D/w nurse     Brad WEST  Yin,   3/4/2020

## 2020-03-04 NOTE — ASSESSMENT & PLAN NOTE
S/p Right Distal Tibia and Fibula ORIF  Continue PT/OT  Continue IS  Continue Bowel regimen  DVT prophylaxis and pain meds per ortho

## 2020-03-05 VITALS
WEIGHT: 122 LBS | DIASTOLIC BLOOD PRESSURE: 78 MMHG | SYSTOLIC BLOOD PRESSURE: 136 MMHG | HEART RATE: 93 BPM | TEMPERATURE: 98.8 F | RESPIRATION RATE: 16 BRPM | HEIGHT: 65 IN | OXYGEN SATURATION: 98 % | BODY MASS INDEX: 20.33 KG/M2

## 2020-03-05 LAB
ANION GAP SERPL CALC-SCNC: 11 MEQ/L (ref 3–15)
BUN SERPL-MCNC: 11 MG/DL (ref 8–20)
CALCIUM SERPL-MCNC: 9.3 MG/DL (ref 8.9–10.3)
CHLORIDE SERPL-SCNC: 100 MEQ/L (ref 98–109)
CO2 SERPL-SCNC: 24 MEQ/L (ref 22–32)
CREAT SERPL-MCNC: 0.6 MG/DL (ref 0.6–1.1)
ERYTHROCYTE [DISTWIDTH] IN BLOOD BY AUTOMATED COUNT: 13.8 % (ref 11.7–14.4)
GFR SERPL CREATININE-BSD FRML MDRD: >60 ML/MIN/1.73M*2
GLUCOSE SERPL-MCNC: 110 MG/DL (ref 70–99)
HCT VFR BLDCO AUTO: 32 % (ref 35–45)
HGB BLD-MCNC: 10.2 G/DL (ref 11.8–15.7)
MCH RBC QN AUTO: 28.4 PG (ref 28–33.2)
MCHC RBC AUTO-ENTMCNC: 31.9 G/DL (ref 32.2–35.5)
MCV RBC AUTO: 89.1 FL (ref 83–98)
PDW BLD AUTO: 10.1 FL (ref 9.4–12.3)
PLATELET # BLD AUTO: 354 K/UL (ref 150–369)
POTASSIUM SERPL-SCNC: 3.8 MEQ/L (ref 3.6–5.1)
RBC # BLD AUTO: 3.59 M/UL (ref 3.93–5.22)
SODIUM SERPL-SCNC: 135 MEQ/L (ref 136–144)
WBC # BLD AUTO: 7.15 K/UL (ref 3.8–10.5)

## 2020-03-05 PROCEDURE — 36415 COLL VENOUS BLD VENIPUNCTURE: CPT | Performed by: ORTHOPAEDIC SURGERY

## 2020-03-05 PROCEDURE — 63700000 HC SELF-ADMINISTRABLE DRUG: Performed by: ORTHOPAEDIC SURGERY

## 2020-03-05 PROCEDURE — 97530 THERAPEUTIC ACTIVITIES: CPT | Mod: GP

## 2020-03-05 PROCEDURE — 99232 SBSQ HOSP IP/OBS MODERATE 35: CPT | Performed by: HOSPITALIST

## 2020-03-05 PROCEDURE — 85027 COMPLETE CBC AUTOMATED: CPT | Performed by: ORTHOPAEDIC SURGERY

## 2020-03-05 PROCEDURE — 63600000 HC DRUGS/DETAIL CODE: Performed by: ORTHOPAEDIC SURGERY

## 2020-03-05 PROCEDURE — 97530 THERAPEUTIC ACTIVITIES: CPT | Mod: GO

## 2020-03-05 PROCEDURE — 80048 BASIC METABOLIC PNL TOTAL CA: CPT | Performed by: ORTHOPAEDIC SURGERY

## 2020-03-05 RX ORDER — ENOXAPARIN SODIUM 100 MG/ML
40 INJECTION SUBCUTANEOUS
Qty: 12 ML | Refills: 0 | Status: SHIPPED | OUTPATIENT
Start: 2020-03-06 | End: 2020-03-05 | Stop reason: HOSPADM

## 2020-03-05 RX ORDER — AMOXICILLIN 250 MG
1 CAPSULE ORAL 2 TIMES DAILY
Qty: 60 TABLET | Refills: 0 | Status: SHIPPED | OUTPATIENT
Start: 2020-03-05 | End: 2020-04-04

## 2020-03-05 RX ORDER — OXYCODONE HYDROCHLORIDE 5 MG/1
5-10 TABLET ORAL EVERY 4 HOURS PRN
Qty: 30 TABLET | Refills: 0 | Status: SHIPPED | OUTPATIENT
Start: 2020-03-05 | End: 2020-03-10

## 2020-03-05 RX ADMIN — ACETAMINOPHEN 650 MG: 325 TABLET, FILM COATED ORAL at 05:53

## 2020-03-05 RX ADMIN — BACLOFEN 30 MG: 10 TABLET ORAL at 11:24

## 2020-03-05 RX ADMIN — OXYBUTYNIN 15 MG: 15 TABLET, FILM COATED, EXTENDED RELEASE ORAL at 08:17

## 2020-03-05 RX ADMIN — ENOXAPARIN SODIUM 40 MG: 40 INJECTION SUBCUTANEOUS at 05:50

## 2020-03-05 RX ADMIN — NITROFURANTOIN (MONOHYDRATE/MACROCRYSTALS) 100 MG: 75; 25 CAPSULE ORAL at 08:16

## 2020-03-05 RX ADMIN — CYCLOSPORINE 1 DROP: 0.5 EMULSION OPHTHALMIC at 08:18

## 2020-03-05 RX ADMIN — DALFAMPRIDINE 10 MG: 10 TABLET, FILM COATED, EXTENDED RELEASE ORAL at 08:20

## 2020-03-05 RX ADMIN — BACLOFEN 30 MG: 10 TABLET ORAL at 08:16

## 2020-03-05 ASSESSMENT — COGNITIVE AND FUNCTIONAL STATUS - GENERAL
MOVING TO AND FROM BED TO CHAIR: 3 - A LITTLE
WALKING IN HOSPITAL ROOM: 1 - TOTAL
AFFECT: WFL
CLIMB 3 TO 5 STEPS WITH RAILING: 1 - TOTAL
STANDING UP FROM CHAIR USING ARMS: 2 - A LOT

## 2020-03-05 NOTE — PLAN OF CARE
Problem: Adult Inpatient Plan of Care  Goal: Plan of Care Review  Outcome: Progressing  Flowsheets (Taken 3/5/2020 1040)  Progress: improving  Plan of Care Reviewed With: patient  Outcome Summary: PT session completed

## 2020-03-05 NOTE — PROGRESS NOTES
Patient: Sonal Alejandra  Location: [facility] 5PAV 5414  MRN: [id number]  Today's date: 3/5/2020     Pt seated in wheelchair, call bell within reach, and RN notified.      Sonal is a 60 y.o. female admitted on 3/3/2020 with Tibia/fibula fracture    Soanl has a past medical history of Bladder dysfunction, Classic migraine, Closed fracture of right lower leg, Constipation, Lipid disorder, MS (multiple sclerosis) (CMS/HCC), Muscle spasm of both lower legs, Numbness, Osteoporosis, PONV (postoperative nausea and vomiting), Self-catheterizes urinary bladder, and Wheelchair bound.    History of Present Illness    S/P R distal tib/fib ORIF, 3/3, nonWB RLE    Patient reports in December she fractured her tibia plateau- [facility] on WC transfers; patient has been home and able to safely transfer into her WC; pt has been using her electric WB in the community  Prior to December, patient ambulated using a tripod walker       OT Vitals    Date/Time Pulse HR Source SpO2 Pt Activity O2 Therapy [facility]   03/05/20 0854 85 Monitor -- -- -- JLD   03/05/20 0902 85 -- 98 % At rest None (Room air) LA      OT Pain    Date/Time Pain Type Pref Pain Scale Side Orientation Location Rating: Rest Rating: Activity Interventions [facility]   03/05/20 0854 Pain Assessment number (Numeric Rating Pain Scale) Right lower leg 3 3 position adjusted JLD   03/05/20 0902 Pain Assessment number (Numeric Rating Pain Scale) Right -- leg 3 3 position adjusted LA          Prior Living Environment      Most Recent Value   Living Arrangements  condominium   Living Environment Comment  lives with spouse, condo, no steps, elevator access          Prior Level of Function      Most Recent Value   Ambulation  other (see comments)   Transferring  assistive equipment   Toileting  independent   Bathing  independent   Dressing  independent   Eating  independent   Prior Level of Function Comment  Patient reports recent right tibial plateau fx in  Decemeber- pt underwent rehab at [facility] and learned how to transfer using a RW into a WC and use a WC for mobility,  pt was then DC home and able to transfer using a RW into her WC, uses an electric WC outside and manual inside her home, independent for ADLs, prior to December pt ambulates using a tripod walker due to hx of MS   Equipment Currently Used at Home  wheelchair, raised toilet, walker, josefina, shower chair, other (see comments) [electric wheelchair]          OT Evaluation and Treatment - 03/05/20 0854        Time Calculation    Start Time  0854     Stop Time  0907     Time Calculation (min)  13 min        Session Details    Document Type  daily treatment/progress note     Mode of Treatment  occupational therapy        General Information    Patient Profile Reviewed?  yes     Existing Precautions/Restrictions  fall;weight bearing        Weight-Bearing Status    Right LE Weight-Bearing Status  non weight-bearing (NWB)        Bed Mobility    [place], Supine to Sit  modified independence        Transfers    Transfers  other (see comments);wheelchair transfer        Wheelchair Transfer    Transfer Technique  lateral     [place], Wheelchair Transfer  modified independence     Assistive Device  wheelchair        Lower Body Dressing    Self-Performance  don;dons/doffs left shoe;dons/doffs left sock     [place]  modified independence     Position  sitting up in bed        Therapy Assessment/Plan (OT)    Rehab Potential (OT)  good, to achieve stated therapy goals     Therapy Frequency (OT)  3-5 times/wk        Progress Summary (OT)    Daily Outcome Statement (OT)  pt  progressing towards OT functional goals        Therapy Plan Review/Discharge Plan (OT)    OT Recommended Discharge Disposition  home with assist     Anticipated Equipment Needs At Discharge (OT)  none     Therapy Plan Review (OT)  care plan/treatment goals reviewed;patient                   Education provided this session. See the Patient  Education summary report for full details.         OT Goals      Most Recent Value   Bed Mobility Goal 1   Activity/Assistive Device  bed mobility activities, all at 03/04/2020 1054   [place]  modified independence at 03/04/2020 1054   Time Frame  5 - 7 days at 03/04/2020 1054   Progress/Outcome  goal met at 03/05/2020 0854   Transfer Goal 1   Activity/Assistive Device  sit-to-stand/stand-to-sit, toilet at 03/04/2020 1054   [place]  modified independence at 03/04/2020 1054   Time Frame  5 - 7 days at 03/04/2020 1054   Progress/Outcome  goal ongoing at 03/05/2020 0854   Dressing Goal 1   Activity/Adaptive Equipment  dressing skills, all at 03/04/2020 1054   [place]  modified independence at 03/04/2020 1054   Time Frame  5 - 7 days at 03/04/2020 1054   Progress/Outcome  goal met at 03/05/2020 0854

## 2020-03-05 NOTE — PROGRESS NOTES
"   Hospital Medicine Service -  Daily Progress Note       SUBJECTIVE   Interval History: No further syncopal episodes.  No chest pains or shortness of breath.  No nausea, vomiting, lightheadedness, or dizziness.     OBJECTIVE      Vital signs in last 24 hours:  Temp:  [36.4 °C (97.5 °F)-37.7 °C (99.8 °F)] 36.4 °C (97.5 °F)  Heart Rate:  [76-99] 83  Resp:  [16-18] 18  BP: ()/(55-75) 116/65    Intake/Output Summary (Last 24 hours) at 3/5/2020 1024  Last data filed at 3/5/2020 0558  Gross per 24 hour   Intake --   Output 3550 ml   Net -3550 ml       PHYSICAL EXAMINATION      Physical Exam   Visit Vitals  /65 (BP Location: Right upper arm, Patient Position: Lying)   Pulse 83   Temp 36.4 °C (97.5 °F) (Oral)   Resp 18   Ht 1.651 m (5' 5\")   Wt 55.3 kg (122 lb)   LMP  (LMP Unknown)   SpO2 98%   Breastfeeding No   BMI 20.30 kg/m²       Physical Exam  General Appearance:        Awake, Alert, Oriented x3   Head:    Normocephalic   Eyes:    No icterus   Respiratory:     Clear to auscultation bilaterally   Cardiovascular:    Regular rate and rhythm   GI:     Soft, non-tender, bowel sounds active    Psychiatric   Appropriate, cooperative        LINES, CATHETERS, DRAINS, AIRWAYS, AND WOUNDS   Lines, Drains, Airways, Wounds:  Peripheral IV 03/03/20 Posterior;Right Forearm (Active)   Number of days: 2       Urethral Catheter (Active)   Number of days: 2       Surgical Incision Pretibial Right (Active)   Number of days: 2       Comments:      LABS / IMAGING / TELE      Labs  Results from last 7 days   Lab Units 03/05/20  0421   WBC K/uL 7.15   HEMOGLOBIN g/dL 10.2*   HEMATOCRIT % 32.0*   PLATELETS K/uL 354     Results from last 7 days   Lab Units 03/05/20  0421   SODIUM mEQ/L 135*   POTASSIUM mEQ/L 3.8   CHLORIDE mEQ/L 100   CO2 mEQ/L 24   BUN mg/dL 11   CREATININE mg/dL 0.6   GLUCOSE mg/dL 110*   CALCIUM mg/dL 9.3         ASSESSMENT AND PLAN      Tibia/fibula fracture  Assessment & Plan  S/p Right Distal Tibia and " "Fibula ORIF  Continue PT/OT  Continue IS  Continue Bowel regimen  DVT prophylaxis and pain meds per ortho      Vasovagal syncope  Assessment & Plan  Had a vasovagal episode yesterday.  No further episodes.  No lightheadedness or dizziness.  No nausea or vomiting.  No chest pains or shortness of breath.  She is completely asymptomatic.  She did not wear the heart monitor because it did not seem to be working properly- saying there was a lot of \"static \"    Dyslipidemia  Assessment & Plan  Continue statin    MS (multiple sclerosis) (CMS/Formerly [facility])  Assessment & Plan  Follow-up with neurology.  Continue usual medications.         Will sign off.  Please call with questions     Brad Esqueda, DO  3/5/2020             "

## 2020-03-05 NOTE — PROGRESS NOTES
Orthopaedic Surgery Progress Note    NAEON. Vasovagal episode yesterday, BP stable overnight. Good PO intake. Doing well with pain well controlled. Otherwise denies chest pain, SOB, fever, chills.     Objective:    Vitals:    03/05/20 0417   BP: (!) 117/55   Pulse: 76   Resp: 16   Temp: 36.8 °C (98.3 °F)   SpO2: 96%       RLE  Splint c/d/i  Appropriate swelling  +EHL/FHL  SILT SPN DPN Plantar  Digits WWP with good cap refill    Assessment and Plan:  60 y.o. female s/p R DistalTibia/Fibula ORIF. Doing well with pain well controlled    -- Possible d/c today  -- NWB RLE  -- young for urinary retention, to be d/c upon discharge  -- DVT PPX: lovneox 40  -- PT/OT    Audie Rosas

## 2020-03-05 NOTE — PATIENT CARE CONFERENCE
Care Progression Rounds Note  Date: 3/5/2020  Time: 10:27 AM     Patient Name: Sonal Alejandra     Medical Record Number: [id number]   YOB: 1960  Sex: Female      Room/Bed: 5414    Admitting Diagnosis: Closed fracture of distal end of right tibia, unspecified fracture morphology, initial encounter [S82.301A]  Closed fracture of distal end of right fibula, unspecified fracture morphology, initial encounter [S82.831A]  Tibia/fibula fracture [S82.209A, S82.409A]   Admit Date/Time: 3/3/2020 11:40 AM    Primary Diagnosis: No Principal Problem: There is no principal problem currently on the Problem List. Please update the Problem List and refresh.  Principal Problem: No Principal Problem: There is no principal problem currently on the Problem List. Please update the Problem List and refresh.    GMLOS: pending  Anticipated Discharge Date: 3/5/2020    AM-PAC  Mobility Score: 12    Discharge Planning:  Living Arrangements: condominium  Anticipated Discharge Disposition: home with home health services, home with assistance    Barriers to Discharge:  Barriers to Discharge: None    Participants:  advanced practice provider, , nursing, physical therapy, social work/services

## 2020-03-05 NOTE — PROGRESS NOTES
Patient: Sonal Alejandra  Location: [facility] 5PAV 5414  MRN: [id number]  Today's date: 3/5/2020     Patient returned to seated position in recliner, NAD, call bell and hospital phone within reach, chair alarm activated, nsg notified     Sonal is a 60 y.o. female admitted on 3/3/2020 with Tibia/fibula fracture    Sonal has a past medical history of Bladder dysfunction, Classic migraine, Closed fracture of right lower leg, Constipation, Lipid disorder, MS (multiple sclerosis) (CMS/HCC), Muscle spasm of both lower legs, Numbness, Osteoporosis, PONV (postoperative nausea and vomiting), Self-catheterizes urinary bladder, and Wheelchair bound.    History of Present Illness    S/P R distal tib/fib ORIF, 3/3, nonWB RLE    Patient reports in December she fractured her tibia plateau- was DC to [facility] to be trained on WC transfers; patient has been home and able to safely transfer into her WC; pt has been using her electric WB in the community  Prior to December, patient ambulated using a tripod walker       PT Vitals    Date/Time Pulse SpO2 Pt Activity O2 Therapy [facility]   03/05/20 0902 85 98 % At rest None (Room air) LA      PT Pain    Date/Time Pain Type Pref Pain Scale Side Location Rating: Rest Rating: Activity Interventions [facility]   03/05/20 0902 Pain Assessment number (Numeric Rating Pain Scale) Right leg 3 3 position adjusted LA          Prior Living Environment      Most Recent Value   Living Arrangements  condominium   Living Environment Comment  lives with spouse, condo, no steps, elevator access          Prior Level of Function      Most Recent Value   Ambulation  other (see comments)   Transferring  assistive equipment   Toileting  independent   Bathing  independent   Dressing  independent   Eating  independent   Prior Level of Function Comment  Patient reports recent right tibial plateau fx in Decemeber- pt underwent rehab at [facility] and learned how to transfer using a RW into a WC and use a WC for  mobility,  pt was then DC home and able to transfer using a RW into her WC, uses an electric WC outside and manual inside her home, independent for ADLs, prior to December pt ambulates using a tripod walker due to hx of MS   Equipment Currently Used at Home  wheelchair, raised toilet, walker, josefina, shower chair, other (see comments) [electric wheelchair]          PT Evaluation and Treatment - 03/05/20 0902        Time Calculation    Start Time  0902     Stop Time  0915     Time Calculation (min)  13 min        Session Details    Document Type  daily treatment/progress note     Mode of Treatment  physical therapy        General Information    Patient Profile Reviewed?  yes     Onset of Illness/Injury or Date of Surgery  03/03/20     Referring Physician  Rusty     General Observations of Patient  Pt received supine in bed     Existing Precautions/Restrictions  fall;weight bearing        Weight-Bearing Status    Right LE Weight-Bearing Status  non weight-bearing (NWB)        Cognition/Psychosocial    Affect/Mental Status (Cognitive)  WFL     Orientation Status (Cognition)  oriented x 4     Follows Commands (Cognition)  follows multi-step commands        Bed Mobility    Bed Mobility  supine to sit to supine     [place], Supine to Sit  modified independence     Assistive Device (Bed Mobility)  head of bed elevated     Comment (Bed Mobility)  no assist needed for transfer to EOB        Transfers    Transfers  other (see comments);wheelchair transfer     Maintains Weight-Bearing Status (Transfers)  able to maintain weight-bearing status     Comment  pt able to perform sliding transfer from bed to her WC with supervision, maintained nonWB to RLE        Bed to Chair Transfer    [place], Bed to Chair  supervision;verbal cues     Verbal Cues  hand placement     Assistive Device  wheelchair     Comment  pt performed sliding transfer frome bed to WC- pt reports this is how she performs her transfers at home        Sit  to Stand Transfer    [place], Sit to Stand Transfer  other (see comments)        Stand to Sit Transfer    [place], Stand to Sit Transfer  other (see comments)        Stand Pivot/Stand Step Transfer    [place], Stand Pivot/Stand Step Transfer  not tested        Gait Training    [place], Gait  unable to assess        Stairs Training    [place], Stairs  other (see comments)    condo- no steps       Balance    Balance Assessment  sitting static balance     Static Sitting Balance  WFL;sitting, edge of bed        AM-PAC (TM) - Mobility (Current Function)    Turning from your back to your side while in a flat bed without using bedrails?  3 - A Little     Moving from lying on your back to sitting on the side of a flat bed without using bedrails?  3 - A Little     Moving to and from a bed to a chair?  3 - A Little     Standing up from a chair using your arms?  2 - A Lot     To walk in a hospital room?  1 - Total     Climbing 3-5 steps with a railing?  1 - Total     AM-PAC (TM) Mobility Score  13        Therapy Assessment/Plan (PT)    Rehab Potential (PT)  good, to achieve stated therapy goals     Therapy Frequency (PT)  5-7 times/wk     Criteria for Skilled Interventions Met (PT)  yes     Problem List  mobility;pain        Progress Summary (PT)    Daily Outcome Statement (PT)  3/5 Pt seen for PT session; patient able to perform a sliding transfer from bed to her WC with supervision, WB restricctions maintained, patient reports she has been performing sliding transfers at home, pt has all necessary adaptive equipment; pt will have HHA during the day when her  is at work     Symptoms Noted During/After Treatment  none        Therapy Plan Review/Discharge Plan (PT)    PT Recommended Discharge Disposition  home with caregiver;home with assist     Anticipated Equipment Needs at Discharge (PT Eval)  none        Plan of Care Review    Plan of Care Reviewed With  patient                        Education provided this session. See the Patient Education summary report for full details.    PT Goals      Most Recent Value   Bed Mobility Goal 1   Activity/Assistive Device  bed mobility activities, all at 03/04/2020 1055   [place]  independent at 03/04/2020 1055   Time Frame  short-term goal (STG) at 03/04/2020 1055   Progress/Outcome  goal ongoing at 03/04/2020 1055   Transfer Goal 1   Activity/Assistive Device  bed-to-chair/chair-to-bed, wheelchair transfer, walker, front-wheeled at 03/04/2020 1055   [place]  modified independence at 03/04/2020 1055   Time Frame  short-term goal (STG) at 03/04/2020 1055   Progress/Outcome  goal ongoing at 03/04/2020 1055

## 2020-03-05 NOTE — PLAN OF CARE
Problem: Adult Inpatient Plan of Care  Goal: Plan of Care Review  Outcome: Progressing  Flowsheets (Taken 3/5/2020 0515)  Progress: improving  Plan of Care Reviewed With: patient  Outcome Summary: Generalized back pain controlled with PRN tylenol. Alex intact draining clear yellow urine. Pt refusing telemetry. RLE dressing C/D/I.

## 2020-03-05 NOTE — DISCHARGE INSTRUCTIONS
Incision Care:  Remain in the splint until you follow up with Dr. Marcelino.     Constipation/ Pain Med:   - Take your pain medication with food to prevent nausea  - Do not drive while taking pain medications.   - Pain medications may cause constipation.   - Drink plenty of fluids and eat fresh fruits and vegetables.  - Please take Senna-Colace as prescribed. Hold for loose stool. If you are having difficulties having a bowel movement or haven't had bowel movement in 2 days, please add over the counter miralax and take as directed on package.   - If you have not had a bowel movement in three days please call the office.    DVT Prophylaxis:   -Continue Lovenox 40 mg Subcutaneous daily x 30 days.  Please discuss further anticoagulation with Dr. Marcelino at your follow up visit.    Follow up with Dr. Marcelino as scheduled. Please call the office at [phone] if you have any questions.

## 2020-03-07 NOTE — DISCHARGE SUMMARY
Inpatient Discharge Summary    Pt is a 60 y.o. female with a history of R ankle fracture. Dr. Marcelino recommended open reduction internal fixation. The patient underwent the procedure on 3/3/2020. Patient tolerated the procedure well. The patient's post-operative course was uncomplicated. The patient was seen by the orthopaedic and hospital medicine service and progressed to the point that on the day of discharge they were voiding without difficulty, tolerating a house diet, and was comfortable with all post-operative activity restrictions. At discharge the patient's vital signs were stable and the splint was clean, dry and intact. The patient was discharged on 3/5/2020 and instructed to take Eliquis  for DVT prophylaxis, continue pain medications and bowel regimen as needed, resume home medications except for those marked as held on discharge instructions, and to follow up with their surgeon. The patient was advised to call the office with any problems, including drainage from the incision, redness around the incision, worsening pain and fever greater than 101 degrees F.     Audie Rosas

## 2020-03-24 DIAGNOSIS — J02.9 SORE THROAT: Primary | ICD-10-CM

## 2020-03-24 RX ORDER — AMOXICILLIN 500 MG/1
500 CAPSULE ORAL EVERY 8 HOURS SCHEDULED
Qty: 21 CAPSULE | Refills: 0 | Status: SHIPPED | OUTPATIENT
Start: 2020-03-24 | End: 2020-03-31

## 2020-04-06 DIAGNOSIS — E78.5 HYPERLIPIDEMIA, UNSPECIFIED HYPERLIPIDEMIA TYPE: ICD-10-CM

## 2020-04-06 DIAGNOSIS — N31.9 BLADDER DYSFUNCTION: ICD-10-CM

## 2020-04-06 DIAGNOSIS — N32.9 BLADDER DISORDER: ICD-10-CM

## 2020-04-06 RX ORDER — SIMVASTATIN 20 MG
20 TABLET ORAL
Qty: 90 TABLET | Refills: 3 | Status: SHIPPED | OUTPATIENT
Start: 2020-04-06 | End: 2020-05-15 | Stop reason: SDUPTHER

## 2020-05-15 DIAGNOSIS — N30.00 ACUTE CYSTITIS WITHOUT HEMATURIA: Primary | ICD-10-CM

## 2020-05-15 DIAGNOSIS — F51.01 PRIMARY INSOMNIA: ICD-10-CM

## 2020-05-15 DIAGNOSIS — R60.1 GENERALIZED EDEMA: ICD-10-CM

## 2020-05-15 DIAGNOSIS — H04.123 DRY EYE SYNDROME OF BOTH EYES: Primary | ICD-10-CM

## 2020-05-15 DIAGNOSIS — N32.9 BLADDER DISORDER: ICD-10-CM

## 2020-05-15 DIAGNOSIS — E78.5 HYPERLIPIDEMIA, UNSPECIFIED HYPERLIPIDEMIA TYPE: ICD-10-CM

## 2020-05-15 DIAGNOSIS — N31.9 BLADDER DYSFUNCTION: ICD-10-CM

## 2020-05-15 DIAGNOSIS — R51.9 NONINTRACTABLE EPISODIC HEADACHE, UNSPECIFIED HEADACHE TYPE: ICD-10-CM

## 2020-05-15 DIAGNOSIS — R60.9 EDEMA, UNSPECIFIED TYPE: ICD-10-CM

## 2020-05-15 RX ORDER — POTASSIUM CHLORIDE 20 MEQ/1
TABLET, EXTENDED RELEASE ORAL
Qty: 90 TABLET | Refills: 3 | Status: SHIPPED | OUTPATIENT
Start: 2020-05-15

## 2020-05-15 RX ORDER — ZOLPIDEM TARTRATE 10 MG/1
10 TABLET ORAL
Qty: 90 TABLET | Refills: 1 | Status: SHIPPED | OUTPATIENT
Start: 2020-05-15 | End: 2020-06-04 | Stop reason: SDUPTHER

## 2020-05-15 RX ORDER — SIMVASTATIN 20 MG
20 TABLET ORAL
Qty: 90 TABLET | Refills: 3 | Status: SHIPPED | OUTPATIENT
Start: 2020-05-15 | End: 2020-08-17 | Stop reason: SDUPTHER

## 2020-05-15 RX ORDER — NITROFURANTOIN 25; 75 MG/1; MG/1
CAPSULE ORAL
Qty: 60 CAPSULE | Refills: 3 | Status: SHIPPED | OUTPATIENT
Start: 2020-05-15 | End: 2020-08-17 | Stop reason: SDUPTHER

## 2020-05-15 RX ORDER — FUROSEMIDE 40 MG/1
TABLET ORAL
Qty: 90 TABLET | Refills: 3 | Status: SHIPPED | OUTPATIENT
Start: 2020-05-15 | End: 2020-08-17 | Stop reason: SDUPTHER

## 2020-05-15 RX ORDER — OXYBUTYNIN CHLORIDE 15 MG/1
15 TABLET, EXTENDED RELEASE ORAL DAILY
Qty: 90 TABLET | Refills: 3 | Status: SHIPPED | OUTPATIENT
Start: 2020-05-15 | End: 2020-08-17 | Stop reason: SDUPTHER

## 2020-05-15 RX ORDER — CYCLOSPORINE 0.5 MG/ML
1 EMULSION OPHTHALMIC EVERY 12 HOURS
Qty: 5.5 ML | Refills: 1 | Status: SHIPPED | OUTPATIENT
Start: 2020-05-15

## 2020-05-15 RX ORDER — BUTALBITAL, ACETAMINOPHEN AND CAFFEINE 50; 325; 40 MG/1; MG/1; MG/1
1 TABLET ORAL EVERY 6 HOURS PRN
Qty: 180 TABLET | Refills: 3 | Status: SHIPPED | OUTPATIENT
Start: 2020-05-15 | End: 2020-08-17 | Stop reason: SDUPTHER

## 2020-05-15 RX ORDER — SULFAMETHOXAZOLE AND TRIMETHOPRIM 800; 160 MG/1; MG/1
1 TABLET ORAL EVERY 12 HOURS SCHEDULED
Qty: 30 TABLET | Refills: 3 | Status: SHIPPED | OUTPATIENT
Start: 2020-05-15 | End: 2020-05-30

## 2020-05-27 ENCOUNTER — TELEPHONE (OUTPATIENT)
Dept: FAMILY MEDICINE CLINIC | Facility: CLINIC | Age: 60
End: 2020-05-27

## 2020-05-27 DIAGNOSIS — R21 RASH: Primary | ICD-10-CM

## 2020-05-27 RX ORDER — NYSTATIN AND TRIAMCINOLONE ACETONIDE 100000; 1 [USP'U]/G; MG/G
OINTMENT TOPICAL 2 TIMES DAILY
Qty: 30 G | Refills: 0 | Status: SHIPPED | OUTPATIENT
Start: 2020-05-27 | End: 2020-10-19

## 2020-06-04 DIAGNOSIS — F51.01 PRIMARY INSOMNIA: ICD-10-CM

## 2020-06-04 DIAGNOSIS — N32.9 BLADDER DISORDER: ICD-10-CM

## 2020-06-04 RX ORDER — ZOLPIDEM TARTRATE 10 MG/1
10 TABLET ORAL
Qty: 90 TABLET | Refills: 1 | Status: SHIPPED | OUTPATIENT
Start: 2020-06-04 | End: 2020-06-05 | Stop reason: SDUPTHER

## 2020-06-04 RX ORDER — TROSPIUM CHLORIDE ER 60 MG/1
60 CAPSULE ORAL
Qty: 90 CAPSULE | Refills: 3 | Status: SHIPPED | OUTPATIENT
Start: 2020-06-04 | End: 2021-02-25 | Stop reason: SDUPTHER

## 2020-06-05 DIAGNOSIS — F51.01 PRIMARY INSOMNIA: ICD-10-CM

## 2020-06-05 RX ORDER — ZOLPIDEM TARTRATE 10 MG/1
10 TABLET ORAL
Qty: 90 TABLET | Refills: 1 | Status: SHIPPED | OUTPATIENT
Start: 2020-06-05 | End: 2020-11-03 | Stop reason: SDUPTHER

## 2020-06-14 DIAGNOSIS — F51.01 PRIMARY INSOMNIA: Primary | ICD-10-CM

## 2020-06-14 RX ORDER — ESZOPICLONE 3 MG/1
3 TABLET, FILM COATED ORAL
Qty: 90 TABLET | Refills: 0 | Status: SHIPPED | OUTPATIENT
Start: 2020-06-14 | End: 2020-10-19

## 2020-06-25 ENCOUNTER — TELEPHONE (OUTPATIENT)
Dept: FAMILY MEDICINE CLINIC | Facility: CLINIC | Age: 60
End: 2020-06-25

## 2020-06-25 NOTE — TELEPHONE ENCOUNTER
[facility] prior auth department called regarding a prior auth for Madonna Hernández   reference number is [id number]         Please call [phone]

## 2020-07-06 DIAGNOSIS — N32.9 BLADDER DISORDER: ICD-10-CM

## 2020-07-14 DIAGNOSIS — E78.00 PURE HYPERCHOLESTEROLEMIA: Primary | ICD-10-CM

## 2020-07-21 LAB
CHOLEST SERPL-MCNC: 191 MG/DL (ref 100–199)
HDLC SERPL-MCNC: 72 MG/DL
LDLC SERPL CALC-MCNC: 75 MG/DL (ref 0–99)
SL AMB VLDL CHOLESTEROL CALC: 44 MG/DL (ref 5–40)
TRIGL SERPL-MCNC: 218 MG/DL (ref 0–149)
TSH SERPL DL<=0.005 MIU/L-ACNC: 3.7 UIU/ML (ref 0.45–4.5)

## 2020-08-17 ENCOUNTER — TELEPHONE (OUTPATIENT)
Dept: FAMILY MEDICINE CLINIC | Facility: CLINIC | Age: 60
End: 2020-08-17

## 2020-08-17 DIAGNOSIS — R51.9 NONINTRACTABLE EPISODIC HEADACHE, UNSPECIFIED HEADACHE TYPE: ICD-10-CM

## 2020-08-17 DIAGNOSIS — N32.9 BLADDER DISORDER: ICD-10-CM

## 2020-08-17 DIAGNOSIS — N31.9 BLADDER DYSFUNCTION: ICD-10-CM

## 2020-08-17 DIAGNOSIS — E78.5 HYPERLIPIDEMIA, UNSPECIFIED HYPERLIPIDEMIA TYPE: ICD-10-CM

## 2020-08-17 DIAGNOSIS — R60.1 GENERALIZED EDEMA: ICD-10-CM

## 2020-08-17 NOTE — TELEPHONE ENCOUNTER
Patient is also asking for a script for Bactrim 1 po BID x's 5 days for UTI #30 tablets  All scripts should be sent to [facility]    Thanks

## 2020-08-18 RX ORDER — OXYBUTYNIN CHLORIDE 15 MG/1
15 TABLET, EXTENDED RELEASE ORAL DAILY
Qty: 90 TABLET | Refills: 3 | Status: SHIPPED | OUTPATIENT
Start: 2020-08-18 | End: 2021-04-12

## 2020-08-18 RX ORDER — FUROSEMIDE 40 MG/1
TABLET ORAL
Qty: 90 TABLET | Refills: 3 | Status: SHIPPED | OUTPATIENT
Start: 2020-08-18 | End: 2021-10-21 | Stop reason: SDUPTHER

## 2020-08-18 RX ORDER — NITROFURANTOIN 25; 75 MG/1; MG/1
CAPSULE ORAL
Qty: 60 CAPSULE | Refills: 3 | Status: SHIPPED | OUTPATIENT
Start: 2020-08-18 | End: 2020-10-19

## 2020-08-18 RX ORDER — BUTALBITAL, ACETAMINOPHEN AND CAFFEINE 50; 325; 40 MG/1; MG/1; MG/1
1 TABLET ORAL EVERY 6 HOURS PRN
Qty: 180 TABLET | Refills: 3 | Status: SHIPPED | OUTPATIENT
Start: 2020-08-18 | End: 2020-08-19 | Stop reason: SDUPTHER

## 2020-08-18 RX ORDER — SIMVASTATIN 20 MG
20 TABLET ORAL
Qty: 90 TABLET | Refills: 3 | Status: SHIPPED | OUTPATIENT
Start: 2020-08-18 | End: 2021-04-12

## 2020-08-19 DIAGNOSIS — R51.9 NONINTRACTABLE EPISODIC HEADACHE, UNSPECIFIED HEADACHE TYPE: ICD-10-CM

## 2020-08-19 DIAGNOSIS — N32.9 BLADDER DISORDER: Primary | ICD-10-CM

## 2020-08-19 RX ORDER — SULFAMETHOXAZOLE AND TRIMETHOPRIM 800; 160 MG/1; MG/1
1 TABLET ORAL EVERY 12 HOURS SCHEDULED
Qty: 30 TABLET | Refills: 1 | Status: SHIPPED | OUTPATIENT
Start: 2020-08-19 | End: 2020-08-24

## 2020-08-20 RX ORDER — BUTALBITAL, ACETAMINOPHEN AND CAFFEINE 50; 325; 40 MG/1; MG/1; MG/1
1 TABLET ORAL EVERY 6 HOURS PRN
Qty: 180 TABLET | Refills: 3 | Status: SHIPPED | OUTPATIENT
Start: 2020-08-20 | End: 2020-10-01 | Stop reason: SDUPTHER

## 2020-08-24 ENCOUNTER — TELEPHONE (OUTPATIENT)
Dept: FAMILY MEDICINE CLINIC | Facility: CLINIC | Age: 60
End: 2020-08-24

## 2020-08-24 NOTE — TELEPHONE ENCOUNTER
[facility] called and has some questions on her ditropan and macrobid    [phone]  [facility]#[phone]

## 2020-08-25 ENCOUNTER — TELEPHONE (OUTPATIENT)
Dept: FAMILY MEDICINE CLINIC | Facility: CLINIC | Age: 60
End: 2020-08-25

## 2020-08-25 NOTE — TELEPHONE ENCOUNTER
Is Pancho Burgess  also taking Gelnique 10% gel  Which is the same as Ditropan since the  are both Oxybutynin  Please call [phone]     Same reference number as before

## 2020-08-25 NOTE — TELEPHONE ENCOUNTER
[facility] needs clarification is pt still taking the Trospoium XR and the Ditropan XL or was one stopped?         Re [id number]

## 2020-08-25 NOTE — TELEPHONE ENCOUNTER
They answers yes she is supposed to be on both of these per her urogynecologist   We did sign a form yesterday and send this stating the same thing

## 2020-10-01 ENCOUNTER — TELEPHONE (OUTPATIENT)
Dept: FAMILY MEDICINE CLINIC | Facility: CLINIC | Age: 60
End: 2020-10-01

## 2020-10-01 DIAGNOSIS — R51.9 NONINTRACTABLE EPISODIC HEADACHE, UNSPECIFIED HEADACHE TYPE: ICD-10-CM

## 2020-10-01 RX ORDER — BUTALBITAL, ACETAMINOPHEN AND CAFFEINE 50; 325; 40 MG/1; MG/1; MG/1
1 TABLET ORAL EVERY 6 HOURS PRN
Qty: 180 TABLET | Refills: 3 | Status: SHIPPED | OUTPATIENT
Start: 2020-10-01 | End: 2021-02-10

## 2020-10-16 ENCOUNTER — TELEPHONE (OUTPATIENT)
Dept: FAMILY MEDICINE CLINIC | Facility: CLINIC | Age: 60
End: 2020-10-16

## 2020-10-19 ENCOUNTER — OFFICE VISIT (OUTPATIENT)
Dept: FAMILY MEDICINE CLINIC | Facility: CLINIC | Age: 60
End: 2020-10-19
Payer: COMMERCIAL

## 2020-10-19 VITALS
TEMPERATURE: 97.5 F | OXYGEN SATURATION: 97 % | DIASTOLIC BLOOD PRESSURE: 80 MMHG | SYSTOLIC BLOOD PRESSURE: 124 MMHG | RESPIRATION RATE: 20 BRPM | HEART RATE: 61 BPM

## 2020-10-19 DIAGNOSIS — R26.2 AMBULATORY DYSFUNCTION: ICD-10-CM

## 2020-10-19 DIAGNOSIS — E78.2 MIXED HYPERLIPIDEMIA: ICD-10-CM

## 2020-10-19 DIAGNOSIS — M81.0 OSTEOPOROSIS, UNSPECIFIED OSTEOPOROSIS TYPE, UNSPECIFIED PATHOLOGICAL FRACTURE PRESENCE: ICD-10-CM

## 2020-10-19 DIAGNOSIS — Z00.00 HEALTHCARE MAINTENANCE: Primary | ICD-10-CM

## 2020-10-19 DIAGNOSIS — G35 MS (MULTIPLE SCLEROSIS) (HCC): ICD-10-CM

## 2020-10-19 DIAGNOSIS — R32 URINARY INCONTINENCE, UNSPECIFIED TYPE: ICD-10-CM

## 2020-10-19 DIAGNOSIS — G43.809 OTHER MIGRAINE WITHOUT STATUS MIGRAINOSUS, NOT INTRACTABLE: ICD-10-CM

## 2020-10-19 DIAGNOSIS — Z23 ENCOUNTER FOR IMMUNIZATION: ICD-10-CM

## 2020-10-19 PROCEDURE — 1036F TOBACCO NON-USER: CPT | Performed by: FAMILY MEDICINE

## 2020-10-19 PROCEDURE — 90682 RIV4 VACC RECOMBINANT DNA IM: CPT

## 2020-10-19 PROCEDURE — 90471 IMMUNIZATION ADMIN: CPT

## 2020-10-19 PROCEDURE — 3725F SCREEN DEPRESSION PERFORMED: CPT | Performed by: FAMILY MEDICINE

## 2020-10-19 PROCEDURE — 3079F DIAST BP 80-89 MM HG: CPT | Performed by: FAMILY MEDICINE

## 2020-10-19 PROCEDURE — 99396 PREV VISIT EST AGE 40-64: CPT | Performed by: FAMILY MEDICINE

## 2020-10-19 RX ORDER — NITROFURANTOIN MACROCRYSTALS 100 MG/1
100 CAPSULE ORAL EVERY OTHER DAY
COMMUNITY
End: 2021-06-25 | Stop reason: SDUPTHER

## 2020-10-21 ENCOUNTER — TELEPHONE (OUTPATIENT)
Dept: FAMILY MEDICINE CLINIC | Facility: CLINIC | Age: 60
End: 2020-10-21

## 2020-10-28 DIAGNOSIS — F51.01 PRIMARY INSOMNIA: ICD-10-CM

## 2020-10-28 RX ORDER — ZOLPIDEM TARTRATE 10 MG/1
10 TABLET ORAL
Qty: 90 TABLET | Refills: 1 | OUTPATIENT
Start: 2020-10-28

## 2020-11-03 DIAGNOSIS — F51.01 PRIMARY INSOMNIA: ICD-10-CM

## 2020-11-03 RX ORDER — ZOLPIDEM TARTRATE 10 MG/1
10 TABLET ORAL
Qty: 90 TABLET | Refills: 1 | Status: SHIPPED | OUTPATIENT
Start: 2020-11-03 | End: 2021-05-18 | Stop reason: SDUPTHER

## 2020-11-19 LAB
ALBUMIN SERPL-MCNC: 4.8 G/DL (ref 3.8–4.9)
ALBUMIN/GLOB SERPL: 1.9 {RATIO} (ref 1.2–2.2)
ALP SERPL-CCNC: 49 IU/L (ref 39–117)
ALT SERPL-CCNC: 16 IU/L (ref 0–32)
AST SERPL-CCNC: 18 IU/L (ref 0–40)
BILIRUB SERPL-MCNC: <0.2 MG/DL (ref 0–1.2)
BUN SERPL-MCNC: 21 MG/DL (ref 6–24)
BUN/CREAT SERPL: 26 (ref 9–23)
CALCIUM SERPL-MCNC: 10.3 MG/DL (ref 8.7–10.2)
CHLORIDE SERPL-SCNC: 99 MMOL/L (ref 96–106)
CHOLEST SERPL-MCNC: 207 MG/DL (ref 100–199)
CO2 SERPL-SCNC: 27 MMOL/L (ref 20–29)
CREAT SERPL-MCNC: 0.81 MG/DL (ref 0.57–1)
GLOBULIN SER-MCNC: 2.5 G/DL (ref 1.5–4.5)
GLUCOSE SERPL-MCNC: 84 MG/DL (ref 65–99)
HDLC SERPL-MCNC: 85 MG/DL
LDLC SERPL CALC-MCNC: 101 MG/DL (ref 0–99)
POTASSIUM SERPL-SCNC: 4.4 MMOL/L (ref 3.5–5.2)
PROT SERPL-MCNC: 7.3 G/DL (ref 6–8.5)
SL AMB EGFR AFRICAN AMERICAN: 92 ML/MIN/1.73
SL AMB EGFR NON AFRICAN AMERICAN: 80 ML/MIN/1.73
SL AMB VLDL CHOLESTEROL CALC: 21 MG/DL (ref 5–40)
SODIUM SERPL-SCNC: 139 MMOL/L (ref 134–144)
TRIGL SERPL-MCNC: 120 MG/DL (ref 0–149)

## 2021-02-10 DIAGNOSIS — G43.909 MIGRAINE WITHOUT STATUS MIGRAINOSUS, NOT INTRACTABLE, UNSPECIFIED MIGRAINE TYPE: Primary | ICD-10-CM

## 2021-02-10 RX ORDER — BUTALBITAL, ACETAMINOPHEN AND CAFFEINE 50; 325; 40 MG/1; MG/1; MG/1
2 TABLET ORAL EVERY 4 HOURS PRN
Qty: 60 TABLET | Refills: 2 | Status: SHIPPED | OUTPATIENT
Start: 2021-02-10 | End: 2021-06-25 | Stop reason: SDUPTHER

## 2021-02-11 ENCOUNTER — TELEPHONE (OUTPATIENT)
Dept: FAMILY MEDICINE CLINIC | Facility: CLINIC | Age: 61
End: 2021-02-11

## 2021-02-11 NOTE — TELEPHONE ENCOUNTER
Spoke to Giovany and she helped answer some questions as to she is familiar with Pt's history  PA submitted waiting for response

## 2021-02-11 NOTE — TELEPHONE ENCOUNTER
Called Pt and LMOM for her to contact the office  Prior Authorization was started for Mark Cruz  There is a questions asking if Pt has tried and failed other medications for migraines/headaches  Our records for Epic goes back to 2013 and there is no other medications on file  Waiting for Pt's response to continue PA       CoverMyMeds:  Key: [id number]

## 2021-02-23 DIAGNOSIS — F51.01 PRIMARY INSOMNIA: ICD-10-CM

## 2021-02-23 DIAGNOSIS — N32.9 BLADDER DISORDER: ICD-10-CM

## 2021-02-24 NOTE — TELEPHONE ENCOUNTER
Can you call [facility] tomorrow  I spoke with her last night  She states that the zolpidem that is listed as being filled on 02/18 is completely wrong which does not make sense to me  Please double check which of these does she want me to fill

## 2021-02-25 RX ORDER — SULFAMETHOXAZOLE AND TRIMETHOPRIM 800; 160 MG/1; MG/1
1 TABLET ORAL EVERY 12 HOURS SCHEDULED
Qty: 30 TABLET | Refills: 3 | Status: SHIPPED | OUTPATIENT
Start: 2021-02-25 | End: 2022-07-16

## 2021-02-25 RX ORDER — TROSPIUM CHLORIDE ER 60 MG/1
60 CAPSULE ORAL
Qty: 90 CAPSULE | Refills: 3 | Status: SHIPPED | OUTPATIENT
Start: 2021-02-25 | End: 2021-12-06

## 2021-02-25 RX ORDER — TROSPIUM CHLORIDE ER 60 MG/1
CAPSULE ORAL
Qty: 90 CAPSULE | Refills: 3 | OUTPATIENT
Start: 2021-02-25

## 2021-02-25 RX ORDER — SULFAMETHOXAZOLE AND TRIMETHOPRIM 800; 160 MG/1; MG/1
TABLET ORAL
Qty: 60 TABLET | OUTPATIENT
Start: 2021-02-25

## 2021-02-25 RX ORDER — ZOLPIDEM TARTRATE 10 MG/1
TABLET ORAL
Qty: 90 TABLET | OUTPATIENT
Start: 2021-02-25

## 2021-02-25 NOTE — TELEPHONE ENCOUNTER
I spoke to [facility], Zolpidem was filled on 2/18/21, [facility] did track it for me and it is in transit and she should be receiving it in the next few days  I spoke to Zoltan and made her aware, she is asking for you to refill the Bactrim DS and the [address]

## 2021-03-30 DIAGNOSIS — Z23 ENCOUNTER FOR IMMUNIZATION: ICD-10-CM

## 2021-04-09 DIAGNOSIS — N31.9 BLADDER DYSFUNCTION: ICD-10-CM

## 2021-04-09 DIAGNOSIS — E78.5 HYPERLIPIDEMIA, UNSPECIFIED HYPERLIPIDEMIA TYPE: ICD-10-CM

## 2021-04-09 DIAGNOSIS — N32.9 BLADDER DISORDER: ICD-10-CM

## 2021-04-12 RX ORDER — OXYBUTYNIN CHLORIDE 100 MG/G
GEL TRANSDERMAL
Qty: 90 G | Refills: 3 | Status: SHIPPED | OUTPATIENT
Start: 2021-04-12 | End: 2022-01-13

## 2021-04-12 RX ORDER — SIMVASTATIN 20 MG
TABLET ORAL
Qty: 90 TABLET | Refills: 3 | Status: SHIPPED | OUTPATIENT
Start: 2021-04-12 | End: 2022-03-22

## 2021-04-12 RX ORDER — OXYBUTYNIN CHLORIDE 15 MG/1
TABLET, EXTENDED RELEASE ORAL
Qty: 90 TABLET | Refills: 3 | Status: SHIPPED | OUTPATIENT
Start: 2021-04-12 | End: 2022-03-22

## 2021-05-07 DIAGNOSIS — G35 MS (MULTIPLE SCLEROSIS) (HCC): Primary | ICD-10-CM

## 2021-05-07 DIAGNOSIS — Z20.822 ENCOUNTER FOR SCREENING LABORATORY TESTING FOR COVID-19 VIRUS: ICD-10-CM

## 2021-05-18 DIAGNOSIS — F51.01 PRIMARY INSOMNIA: ICD-10-CM

## 2021-05-18 LAB — SARS-COV-2 IGG SERPL QL IA: POSITIVE

## 2021-05-18 RX ORDER — ZOLPIDEM TARTRATE 10 MG/1
10 TABLET ORAL
Qty: 90 TABLET | Refills: 1 | Status: SHIPPED | OUTPATIENT
Start: 2021-05-18 | End: 2021-10-21 | Stop reason: SDUPTHER

## 2021-06-25 DIAGNOSIS — G43.909 MIGRAINE WITHOUT STATUS MIGRAINOSUS, NOT INTRACTABLE, UNSPECIFIED MIGRAINE TYPE: ICD-10-CM

## 2021-06-25 DIAGNOSIS — N30.00 ACUTE CYSTITIS WITHOUT HEMATURIA: Primary | ICD-10-CM

## 2021-06-25 RX ORDER — NITROFURANTOIN MACROCRYSTALS 100 MG/1
100 CAPSULE ORAL EVERY OTHER DAY
Qty: 45 CAPSULE | Refills: 5 | Status: SHIPPED | OUTPATIENT
Start: 2021-06-25 | End: 2022-07-16

## 2021-06-25 RX ORDER — BUTALBITAL, ACETAMINOPHEN AND CAFFEINE 50; 325; 40 MG/1; MG/1; MG/1
2 TABLET ORAL EVERY 4 HOURS PRN
Qty: 180 TABLET | Refills: 0 | Status: SHIPPED | OUTPATIENT
Start: 2021-06-25 | End: 2021-10-21 | Stop reason: SDUPTHER

## 2021-08-12 ENCOUNTER — TELEPHONE (OUTPATIENT)
Dept: FAMILY MEDICINE CLINIC | Facility: CLINIC | Age: 61
End: 2021-08-12

## 2021-08-12 NOTE — TELEPHONE ENCOUNTER
Per Cover my meds website Prior Auth for patient medication Butalbital-APAP-Caffeine is not required  Called [facility] [phone] spoke with Chandra Silva made her aware Prior Auth not required per plan

## 2021-08-16 ENCOUNTER — TELEPHONE (OUTPATIENT)
Dept: FAMILY MEDICINE CLINIC | Facility: CLINIC | Age: 61
End: 2021-08-16

## 2021-08-16 NOTE — TELEPHONE ENCOUNTER
Submitted prior auth through covermymeds for patient medication Butalbital-APAP-Caffeine -40 Mg Tabs Case ID # [id number]

## 2021-08-27 ENCOUNTER — TELEPHONE (OUTPATIENT)
Dept: FAMILY MEDICINE CLINIC | Facility: CLINIC | Age: 61
End: 2021-08-27

## 2021-08-27 NOTE — TELEPHONE ENCOUNTER
Patient returned call, she received 180 tablets of her butalbital/apap/caff on 8/13/21 from [facility]  We are both confused why we received the fax stating her prior [address] was denied

## 2021-08-27 NOTE — TELEPHONE ENCOUNTER
Not sure what to say since she got her medication    We will just continue to monitor and see what happens with the next refill

## 2021-08-27 NOTE — TELEPHONE ENCOUNTER
Received a prior auth denial for butalbital/apap/caf tablet  I spoke to Zoltan, she thinks she actually received her script in the mail from her pharmacy  She is going to check into this and call me back

## 2021-10-21 ENCOUNTER — OFFICE VISIT (OUTPATIENT)
Dept: FAMILY MEDICINE CLINIC | Facility: CLINIC | Age: 61
End: 2021-10-21
Payer: MEDICARE

## 2021-10-21 VITALS
TEMPERATURE: 97.6 F | HEART RATE: 78 BPM | WEIGHT: 136.6 LBS | SYSTOLIC BLOOD PRESSURE: 142 MMHG | RESPIRATION RATE: 16 BRPM | HEIGHT: 65 IN | BODY MASS INDEX: 22.76 KG/M2 | DIASTOLIC BLOOD PRESSURE: 84 MMHG | OXYGEN SATURATION: 98 %

## 2021-10-21 DIAGNOSIS — M81.0 OSTEOPOROSIS, UNSPECIFIED OSTEOPOROSIS TYPE, UNSPECIFIED PATHOLOGICAL FRACTURE PRESENCE: ICD-10-CM

## 2021-10-21 DIAGNOSIS — G35 MS (MULTIPLE SCLEROSIS) (HCC): ICD-10-CM

## 2021-10-21 DIAGNOSIS — Z00.00 HEALTHCARE MAINTENANCE: Primary | ICD-10-CM

## 2021-10-21 DIAGNOSIS — R60.1 GENERALIZED EDEMA: ICD-10-CM

## 2021-10-21 DIAGNOSIS — G43.909 MIGRAINE WITHOUT STATUS MIGRAINOSUS, NOT INTRACTABLE, UNSPECIFIED MIGRAINE TYPE: ICD-10-CM

## 2021-10-21 DIAGNOSIS — E78.2 MIXED HYPERLIPIDEMIA: ICD-10-CM

## 2021-10-21 DIAGNOSIS — N32.9 BLADDER DISORDER: ICD-10-CM

## 2021-10-21 DIAGNOSIS — F51.01 PRIMARY INSOMNIA: ICD-10-CM

## 2021-10-21 DIAGNOSIS — R26.2 AMBULATORY DYSFUNCTION: ICD-10-CM

## 2021-10-21 PROBLEM — E78.5 DYSLIPIDEMIA: Status: ACTIVE | Noted: 2020-03-04

## 2021-10-21 PROCEDURE — 3725F SCREEN DEPRESSION PERFORMED: CPT | Performed by: FAMILY MEDICINE

## 2021-10-21 PROCEDURE — 3008F BODY MASS INDEX DOCD: CPT | Performed by: FAMILY MEDICINE

## 2021-10-21 PROCEDURE — 1036F TOBACCO NON-USER: CPT | Performed by: FAMILY MEDICINE

## 2021-10-21 PROCEDURE — G0402 INITIAL PREVENTIVE EXAM: HCPCS | Performed by: FAMILY MEDICINE

## 2021-10-21 RX ORDER — ZOLPIDEM TARTRATE 10 MG/1
10 TABLET ORAL
Qty: 90 TABLET | Refills: 1 | Status: SHIPPED | OUTPATIENT
Start: 2021-10-21 | End: 2022-04-06 | Stop reason: SDUPTHER

## 2021-10-21 RX ORDER — BUTALBITAL, ACETAMINOPHEN AND CAFFEINE 50; 325; 40 MG/1; MG/1; MG/1
2 TABLET ORAL EVERY 4 HOURS PRN
Qty: 180 TABLET | Refills: 0 | Status: SHIPPED | OUTPATIENT
Start: 2021-10-21 | End: 2022-01-13

## 2021-10-21 RX ORDER — FUROSEMIDE 40 MG/1
TABLET ORAL
Qty: 90 TABLET | Refills: 3 | Status: SHIPPED | OUTPATIENT
Start: 2021-10-21

## 2021-12-06 DIAGNOSIS — N32.9 BLADDER DISORDER: ICD-10-CM

## 2021-12-06 RX ORDER — TROSPIUM CHLORIDE ER 60 MG/1
CAPSULE ORAL
Qty: 90 CAPSULE | Refills: 3 | Status: SHIPPED | OUTPATIENT
Start: 2021-12-06

## 2022-01-19 LAB
25(OH)D3+25(OH)D2 SERPL-MCNC: 43 NG/ML (ref 30–100)
CHOLEST SERPL-MCNC: 202 MG/DL (ref 100–199)
HDLC SERPL-MCNC: 67 MG/DL
LDLC SERPL CALC-MCNC: 115 MG/DL (ref 0–99)
SL AMB VLDL CHOLESTEROL CALC: 20 MG/DL (ref 5–40)
TRIGL SERPL-MCNC: 113 MG/DL (ref 0–149)
TSH SERPL DL<=0.005 MIU/L-ACNC: 3.86 UIU/ML (ref 0.45–4.5)

## 2022-02-14 DIAGNOSIS — L03.039 PARONYCHIA OF GREAT TOE: Primary | ICD-10-CM

## 2022-02-14 RX ORDER — CEPHALEXIN 500 MG/1
500 CAPSULE ORAL EVERY 8 HOURS SCHEDULED
Qty: 21 CAPSULE | Refills: 0 | Status: SHIPPED | OUTPATIENT
Start: 2022-02-14 | End: 2022-02-21

## 2022-04-06 DIAGNOSIS — F51.01 PRIMARY INSOMNIA: ICD-10-CM

## 2022-04-12 RX ORDER — ZOLPIDEM TARTRATE 10 MG/1
10 TABLET ORAL
Qty: 90 TABLET | Refills: 0 | Status: SHIPPED | OUTPATIENT
Start: 2022-04-12 | End: 2022-06-01 | Stop reason: SDUPTHER

## 2022-05-10 DIAGNOSIS — L03.039 PARONYCHIA OF GREAT TOE: Primary | ICD-10-CM

## 2022-05-10 RX ORDER — CEPHALEXIN 500 MG/1
500 CAPSULE ORAL EVERY 8 HOURS SCHEDULED
Qty: 21 CAPSULE | Refills: 1 | Status: SHIPPED | OUTPATIENT
Start: 2022-05-10 | End: 2022-05-17

## 2022-06-01 DIAGNOSIS — F51.01 PRIMARY INSOMNIA: ICD-10-CM

## 2022-06-01 RX ORDER — ZOLPIDEM TARTRATE 10 MG/1
10 TABLET ORAL
Qty: 90 TABLET | Refills: 0 | Status: SHIPPED | OUTPATIENT
Start: 2022-06-01

## 2022-07-13 ENCOUNTER — TELEPHONE (OUTPATIENT)
Dept: FAMILY MEDICINE CLINIC | Facility: CLINIC | Age: 62
End: 2022-07-13

## 2022-07-13 NOTE — TELEPHONE ENCOUNTER
Prior auth started for zolpidem via CoverMyMeds, key#:  [id number] and gelnique key#:  [id number]  Awaiting response

## 2022-07-14 DIAGNOSIS — N32.9 BLADDER DISORDER: ICD-10-CM

## 2022-07-14 DIAGNOSIS — E78.5 HYPERLIPIDEMIA, UNSPECIFIED HYPERLIPIDEMIA TYPE: ICD-10-CM

## 2022-07-14 DIAGNOSIS — N30.00 ACUTE CYSTITIS WITHOUT HEMATURIA: ICD-10-CM

## 2022-07-14 DIAGNOSIS — N31.9 BLADDER DYSFUNCTION: ICD-10-CM

## 2022-07-16 RX ORDER — NITROFURANTOIN MACROCRYSTALS 100 MG/1
CAPSULE ORAL
Qty: 45 CAPSULE | Refills: 5 | Status: SHIPPED | OUTPATIENT
Start: 2022-07-16

## 2022-07-16 RX ORDER — SULFAMETHOXAZOLE AND TRIMETHOPRIM 800; 160 MG/1; MG/1
TABLET ORAL
Qty: 30 TABLET | Refills: 3 | Status: SHIPPED | OUTPATIENT
Start: 2022-07-16 | End: 2022-07-21

## 2022-07-18 RX ORDER — SIMVASTATIN 20 MG
TABLET ORAL
Qty: 90 TABLET | Refills: 3 | Status: SHIPPED | OUTPATIENT
Start: 2022-07-18

## 2022-07-18 RX ORDER — OXYBUTYNIN CHLORIDE 15 MG/1
TABLET, EXTENDED RELEASE ORAL
Qty: 90 TABLET | Refills: 3 | Status: SHIPPED | OUTPATIENT
Start: 2022-07-18

## 2022-08-08 DIAGNOSIS — G43.909 MIGRAINE WITHOUT STATUS MIGRAINOSUS, NOT INTRACTABLE, UNSPECIFIED MIGRAINE TYPE: ICD-10-CM

## 2022-08-08 DIAGNOSIS — G35 MULTIPLE SCLEROSIS (HCC): ICD-10-CM

## 2022-08-08 RX ORDER — DALFAMPRIDINE 10 MG/1
1 TABLET, FILM COATED, EXTENDED RELEASE ORAL 2 TIMES DAILY
Qty: 90 TABLET | Refills: 3 | Status: SHIPPED | OUTPATIENT
Start: 2022-08-08

## 2022-08-08 RX ORDER — BUTALBITAL, ACETAMINOPHEN AND CAFFEINE 50; 325; 40 MG/1; MG/1; MG/1
2 TABLET ORAL EVERY 6 HOURS PRN
Qty: 180 TABLET | Refills: 1 | Status: SHIPPED | OUTPATIENT
Start: 2022-08-08

## 2022-08-29 DIAGNOSIS — R39.9 UTI SYMPTOMS: Primary | ICD-10-CM

## 2022-09-01 DIAGNOSIS — R39.9 UTI SYMPTOMS: Primary | ICD-10-CM

## 2022-09-01 LAB
APPEARANCE UR: CLEAR
BACTERIA UR CULT: NORMAL
BACTERIA URNS QL MICRO: ABNORMAL
BILIRUB UR QL STRIP: NEGATIVE
CASTS URNS QL MICRO: ABNORMAL /LPF
COLOR UR: YELLOW
EPI CELLS #/AREA URNS HPF: ABNORMAL /HPF (ref 0–10)
GLUCOSE UR QL: NEGATIVE
HGB UR QL STRIP: NEGATIVE
KETONES UR QL STRIP: NEGATIVE
LEUKOCYTE ESTERASE UR QL STRIP: ABNORMAL
Lab: NO GROWTH
MICRO URNS: ABNORMAL
NITRITE UR QL STRIP: NEGATIVE
PH UR STRIP: 6 [PH] (ref 5–7.5)
PROT UR QL STRIP: NEGATIVE
RBC #/AREA URNS HPF: ABNORMAL /HPF (ref 0–2)
SL AMB URINALYSIS REFLEX: ABNORMAL
SP GR UR: 1.02 (ref 1–1.03)
UROBILINOGEN UR STRIP-ACNC: 0.2 MG/DL (ref 0.2–1)
WBC #/AREA URNS HPF: >30 /HPF (ref 0–5)

## 2022-09-01 RX ORDER — CIPROFLOXACIN 500 MG/1
500 TABLET, FILM COATED ORAL EVERY 12 HOURS SCHEDULED
Qty: 20 TABLET | Refills: 0 | Status: SHIPPED | OUTPATIENT
Start: 2022-09-01 | End: 2022-09-11

## 2022-10-01 DIAGNOSIS — E78.5 HYPERLIPIDEMIA, UNSPECIFIED HYPERLIPIDEMIA TYPE: ICD-10-CM

## 2022-10-01 DIAGNOSIS — I10 PRIMARY HYPERTENSION: Primary | ICD-10-CM

## 2022-10-01 RX ORDER — OLMESARTAN MEDOXOMIL 20 MG/1
20 TABLET ORAL DAILY
Qty: 30 TABLET | Refills: 1 | Status: SHIPPED | OUTPATIENT
Start: 2022-10-01 | End: 2022-10-24

## 2022-10-07 DIAGNOSIS — E03.9 HYPOTHYROIDISM, UNSPECIFIED TYPE: Primary | ICD-10-CM

## 2022-10-07 LAB
ALBUMIN SERPL-MCNC: 4.6 G/DL (ref 3.8–4.8)
ALBUMIN/GLOB SERPL: 2.4 {RATIO} (ref 1.2–2.2)
ALP SERPL-CCNC: 35 IU/L (ref 44–121)
ALT SERPL-CCNC: 22 IU/L (ref 0–32)
AST SERPL-CCNC: 24 IU/L (ref 0–40)
BILIRUB SERPL-MCNC: <0.2 MG/DL (ref 0–1.2)
BUN SERPL-MCNC: 18 MG/DL (ref 8–27)
BUN/CREAT SERPL: 25 (ref 12–28)
CALCIUM SERPL-MCNC: 10.3 MG/DL (ref 8.7–10.3)
CHLORIDE SERPL-SCNC: 100 MMOL/L (ref 96–106)
CHOLEST SERPL-MCNC: 195 MG/DL (ref 100–199)
CO2 SERPL-SCNC: 26 MMOL/L (ref 20–29)
CREAT SERPL-MCNC: 0.73 MG/DL (ref 0.57–1)
EGFR: 94 ML/MIN/1.73
GLOBULIN SER-MCNC: 1.9 G/DL (ref 1.5–4.5)
GLUCOSE SERPL-MCNC: 82 MG/DL (ref 70–99)
HDLC SERPL-MCNC: 70 MG/DL
LDLC SERPL CALC-MCNC: 107 MG/DL (ref 0–99)
POTASSIUM SERPL-SCNC: 4.3 MMOL/L (ref 3.5–5.2)
PROT SERPL-MCNC: 6.5 G/DL (ref 6–8.5)
SL AMB VLDL CHOLESTEROL CALC: 18 MG/DL (ref 5–40)
SODIUM SERPL-SCNC: 142 MMOL/L (ref 134–144)
TRIGL SERPL-MCNC: 101 MG/DL (ref 0–149)
TSH SERPL DL<=0.005 MIU/L-ACNC: 5.8 UIU/ML (ref 0.45–4.5)

## 2022-10-19 LAB
T3FREE SERPL-MCNC: 3.3 PG/ML (ref 2–4.4)
T4 FREE SERPL-MCNC: 1.24 NG/DL (ref 0.82–1.77)
THYROGLOB AB SERPL-ACNC: <1 IU/ML (ref 0–0.9)
THYROPEROXIDASE AB SERPL-ACNC: <8 IU/ML (ref 0–34)
TSH SERPL DL<=0.005 MIU/L-ACNC: 3.88 UIU/ML (ref 0.45–4.5)

## 2022-10-28 DIAGNOSIS — R39.9 UTI SYMPTOMS: Primary | ICD-10-CM

## 2022-10-31 DIAGNOSIS — R39.9 UTI SYMPTOMS: Primary | ICD-10-CM

## 2022-11-07 DIAGNOSIS — R39.9 UTI SYMPTOMS: Primary | ICD-10-CM

## 2022-11-07 LAB
APPEARANCE UR: ABNORMAL
BACTERIA UR CULT: ABNORMAL
BACTERIA URNS QL MICRO: ABNORMAL
BILIRUB UR QL STRIP: NEGATIVE
CASTS URNS QL MICRO: ABNORMAL /LPF
COLOR UR: YELLOW
CRYSTALS URNS MICRO: ABNORMAL
EPI CELLS #/AREA URNS HPF: ABNORMAL /HPF (ref 0–10)
GLUCOSE UR QL: ABNORMAL
HGB UR QL STRIP: ABNORMAL
KETONES UR QL STRIP: NEGATIVE
LEUKOCYTE ESTERASE UR QL STRIP: ABNORMAL
Lab: ABNORMAL
MICRO URNS: ABNORMAL
NITRITE UR QL STRIP: POSITIVE
PH UR STRIP: >=9 [PH] (ref 5–7.5)
PROT UR QL STRIP: ABNORMAL
RBC #/AREA URNS HPF: ABNORMAL /HPF (ref 0–2)
SL AMB ANTIMICROBIAL SUSCEPTIBILITY: ABNORMAL
SL AMB URINALYSIS REFLEX: ABNORMAL
SP GR UR: 1.03 (ref 1–1.03)
UNIDENT CRYS URNS QL MICRO: PRESENT
UROBILINOGEN UR STRIP-ACNC: 1 MG/DL (ref 0.2–1)
WBC #/AREA URNS HPF: ABNORMAL /HPF (ref 0–5)

## 2022-11-09 ENCOUNTER — TELEPHONE (OUTPATIENT)
Dept: ADMINISTRATIVE | Facility: OTHER | Age: 62
End: 2022-11-09

## 2022-11-09 NOTE — LETTER
Procedure Request Form: Mammogram      Date Requested: 22  Patient: Yeison Gatica  Patient : [id number]   Referring Provider: Modesta Bridegroom, DO        Date of Procedure ______________________________       The above patient has informed us that they have completed their   most recent Mammogram at your facility  Please complete   this form and attach all corresponding procedure reports/results  Comments __________________________________________________________  ____________________________________________________________________  ____________________________________________________________________  ____________________________________________________________________    Facility Completing Procedure _________________________________________    Form Completed By (print name) _______________________________________      Signature __________________________________________________________      These reports are needed for  compliance  Please fax this completed form and a copy of the procedure report to our office located at [place] [address] as soon as possible to [phone] amanda Allan: Phone [phone]    We thank you for your assistance in treating our mutual patient

## 2022-11-09 NOTE — TELEPHONE ENCOUNTER
Upon review of the In Basket request and the patient's chart, initial outreach has been made via fax to facility  , please see Contacts section for details       Thank you  Doron Million

## 2022-11-09 NOTE — TELEPHONE ENCOUNTER
----- Message from Jerardo Bright sent at 11/9/2022 10:09 AM EST -----  Regarding: MAMMOGRAM  11/09/22 10:09 AM    Hello, our patient Belen Fitch has had Mammogram completed/performed  Please assist in updating the patient chart by pulling the Care Everywhere (CE) document  The date of service is 11/08/2021       Thank you,  Paul [facility]

## 2022-11-11 ENCOUNTER — OFFICE VISIT (OUTPATIENT)
Dept: FAMILY MEDICINE CLINIC | Facility: CLINIC | Age: 62
End: 2022-11-11

## 2022-11-11 VITALS
RESPIRATION RATE: 16 BRPM | DIASTOLIC BLOOD PRESSURE: 80 MMHG | TEMPERATURE: 97.6 F | HEART RATE: 73 BPM | SYSTOLIC BLOOD PRESSURE: 118 MMHG | OXYGEN SATURATION: 96 %

## 2022-11-11 DIAGNOSIS — E78.5 HYPERLIPIDEMIA, UNSPECIFIED HYPERLIPIDEMIA TYPE: ICD-10-CM

## 2022-11-11 DIAGNOSIS — R39.9 UTI SYMPTOMS: ICD-10-CM

## 2022-11-11 DIAGNOSIS — N31.9 BLADDER DYSFUNCTION: ICD-10-CM

## 2022-11-11 DIAGNOSIS — Z12.11 COLON CANCER SCREENING: ICD-10-CM

## 2022-11-11 DIAGNOSIS — I10 PRIMARY HYPERTENSION: Primary | ICD-10-CM

## 2022-11-11 DIAGNOSIS — G35 MULTIPLE SCLEROSIS (HCC): ICD-10-CM

## 2022-11-11 DIAGNOSIS — R60.1 GENERALIZED EDEMA: ICD-10-CM

## 2022-11-11 RX ORDER — FUROSEMIDE 40 MG/1
TABLET ORAL
Qty: 90 TABLET | Refills: 3 | Status: SHIPPED | OUTPATIENT
Start: 2022-11-11

## 2022-11-11 RX ORDER — OXYBUTYNIN CHLORIDE 15 MG/1
15 TABLET, EXTENDED RELEASE ORAL DAILY
Qty: 90 TABLET | Refills: 3 | Status: SHIPPED | OUTPATIENT
Start: 2022-11-11

## 2022-11-11 RX ORDER — SIMVASTATIN 20 MG
20 TABLET ORAL
Qty: 90 TABLET | Refills: 3 | Status: SHIPPED | OUTPATIENT
Start: 2022-11-11

## 2022-11-11 RX ORDER — OLMESARTAN MEDOXOMIL 40 MG/1
40 TABLET ORAL DAILY
Qty: 90 TABLET | Refills: 3 | Status: SHIPPED | OUTPATIENT
Start: 2022-11-11

## 2022-11-11 RX ORDER — NITROFURANTOIN MACROCRYSTALS 100 MG/1
CAPSULE ORAL
Qty: 60 CAPSULE | Refills: 3 | Status: SHIPPED | OUTPATIENT
Start: 2022-11-11

## 2022-11-11 NOTE — PROGRESS NOTES
Assessment and Plan:   Medicare wellness completed  Living will and power-of- discussed  Please see 2nd medical note  Problem List Items Addressed This Visit        Other    Edema    Relevant Medications    furosemide (LASIX) 40 mg tablet    Hyperlipidemia    Relevant Medications    simvastatin (ZOCOR) 20 mg tablet      Other Visit Diagnoses     UTI symptoms    -  Primary    Relevant Medications    nitrofurantoin (MACRODANTIN) 100 mg capsule    Primary hypertension        Relevant Medications    olmesartan (BENICAR) 40 mg tablet    furosemide (LASIX) 40 mg tablet    Multiple sclerosis (HCC)        Bladder dysfunction        Relevant Medications    oxybutynin (DITROPAN XL) 15 MG 24 hr tablet    Colon cancer screening        Relevant Orders    Cologuard        BMI Counseling: BMI was not able to be calculated due to patient refusing height and/or weight  Has MS  Using walker  Declines  Is not overweight  Depression Screening and Follow-up Plan: Patient was screened for depression during today's encounter  They screened negative with a PHQ-2 score of 0  Preventive health issues were discussed with patient, and age appropriate screening tests were ordered as noted in patient's After Visit Summary  Personalized health advice and appropriate referrals for health education or preventive services given if needed, as noted in patient's After Visit Summary       History of Present Illness:     Patient presents for a Medicare Wellness Visit    HPI   Patient Care Team:  Millie Vo DO as PCP - General     Review of Systems:     Review of Systems     Problem List:     Patient Active Problem List   Diagnosis   • Ambulatory dysfunction   • Bladder disorder   • Edema   • Hyperlipidemia   • Insomnia   • Migraine headache   • MS (multiple sclerosis) ([facility] )   • Osteoporosis   • Urinary incontinence   • Vitamin D deficiency   • Infection due to urethral catheter ([facility] )   • Increased frequency of urination   • Lichen sclerosus   • Vulval lesion   • Age-related osteoporosis without current pathological fracture      Past Medical and Surgical History:     Past Medical History:   Diagnosis Date   • Benign essential hypertension     Last Assessed 12/15/2016   • Osteopenia     Resolved 10/19/2017     Past Surgical History:   Procedure Laterality Date   •  SECTION     • COLONOSCOPY      baseline age 49  Hiwot Miah   • HYSTERECTOMY     • TUBAL LIGATION        Family History:     Family History   Problem Relation Age of Onset   • Lung cancer Mother    • Alcohol abuse Father    • Colon cancer Sister         Transverse - stage 1    • Heart attack Family         Acute MI      Social History:     Social History     Socioeconomic History   • Marital status: /Civil Union     Spouse name: None   • Number of children: None   • Years of education: None   • Highest education level: None   Occupational History   • None   Tobacco Use   • Smoking status: Never Smoker   • Smokeless tobacco: Never Used   Vaping Use   • Vaping Use: Never used   Substance and Sexual Activity   • Alcohol use: Yes     Comment: social   • Drug use: No   • Sexual activity: None   Other Topics Concern   • None   Social History Narrative   • None     Social Determinants of Health     Financial Resource Strain: Low Risk    • Difficulty of Paying Living Expenses: Not hard at all   Food Insecurity: Not on file   Transportation Needs: No Transportation Needs   • Lack of Transportation (Medical): No   • Lack of Transportation (Non-Medical):  No   Physical Activity: Not on file   Stress: Not on file   Social Connections: Not on file   Intimate Partner Violence: Not on file   Housing Stability: Not on file      Medications and Allergies:     Current Outpatient Medications   Medication Sig Dispense Refill   • baclofen 20 mg tablet Take 0 5 tablets (10 mg total) by mouth 3 (three) times a day (Patient taking differently: Take 20 mg by mouth 4 (four) times a day) 270 tablet 3   • butalbital-acetaminophen-caffeine (FIORICET,ESGIC) -40 mg per tablet Take 2 tablets by mouth every 6 (six) hours as needed for headaches 180 tablet 1   • Calcium Carbonate (CALCIUM 600 PO) Take by mouth     • Cranberry 1000 MG CAPS Take by mouth     • Dalfampridine ER (Ampyra) 10 MG TB12 Take 1 tablet (10 mg total) by mouth 2 (two) times a day 90 tablet 3   • furosemide (LASIX) 40 mg tablet Use daily as needed for edema 90 tablet 3   • nitrofurantoin (MACRODANTIN) 100 mg capsule Take every other day and post- coital 60 capsule 3   • ocrelizumab 300 MG/10ML SOLN Infuse 600 mg into a venous catheter every 6 (six) months     • olmesartan (BENICAR) 40 mg tablet Take 1 tablet (40 mg total) by mouth daily 90 tablet 3   • oxybutynin (DITROPAN XL) 15 MG 24 hr tablet Take 1 tablet (15 mg total) by mouth daily 90 tablet 3   • Oxybutynin Chloride (Gelnique) 10 % GEL APPLY THE CONTENTS OF 1  PACKET TOPICALLY TO SKIN  DAILY 90 g 3   • potassium chloride (K-DUR,KLOR-CON) 20 mEq tablet Daily as needed for edema 90 tablet 3   • RESTASIS 0 05 % ophthalmic emulsion Administer 1 drop to both eyes every 12 (twelve) hours 5 5 mL 1   • simvastatin (ZOCOR) 20 mg tablet Take 1 tablet (20 mg total) by mouth daily at bedtime 90 tablet 3   • tiZANidine (ZANAFLEX) 4 mg tablet Take 1 tablet (4 mg total) by mouth every 8 (eight) hours as needed for muscle spasms (Patient taking differently: Take 4 mg by mouth every 8 (eight) hours) 270 tablet 3   • trospium (SANCTURA XR) 60 mg 24 hr capsule TAKE 1 CAPSULE BY MOUTH  DAILY BEFORE BREAKFAST 90 capsule 3   • zolpidem (AMBIEN) 10 mg tablet TAKE 1 TABLET BY MOUTH  DAILY AT BEDTIME AS NEEDED  FOR SLEEP 90 tablet 0     No current facility-administered medications for this visit       No Known Allergies   Immunizations:     Immunization History   Administered Date(s) Administered   • INFLUENZA 09/13/2011   • Influenza Quadrivalent Preservative Free 3 years and older IM 11/20/2014, 12/11/2015   • Influenza Quadrivalent, 6-35 Months IM 12/14/2016, 10/18/2017   • Influenza, recombinant, quadrivalent,injectable, preservative free 11/09/2018, 10/17/2019, 10/19/2020   • Influenza, seasonal, injectable 11/15/2013   • Pneumococcal Polysaccharide PPV23 10/07/2010   • Tdap 11/08/2010      Health Maintenance:         Topic Date Due   • DXA SCAN  Never done   • Breast Cancer Screening: Mammogram  11/08/2022   • Colorectal Cancer Screening  11/10/2022   • HIV Screening  11/21/2025 (Originally 2/26/1975)   • Hepatitis C Screening  Completed         Topic Date Due   • COVID-19 Vaccine (1) Never done   • Influenza Vaccine (1) 09/01/2022      Medicare Screening Tests and Risk Assessments:     Robert Noriega is here for her Subsequent Wellness visit  Last Medicare Wellness visit information reviewed, patient interviewed, no change since last AWV  Health Risk Assessment:   Patient rates overall health as fair  Patient feels that their physical health rating is same  Patient is very satisfied with their life  Eyesight was rated as same  Hearing was rated as same  Patient feels that their emotional and mental health rating is same  Patients states they are never, rarely angry  Patient states they are sometimes unusually tired/fatigued  Pain experienced in the last 7 days has been none  Patient states that she has experienced no weight loss or gain in last 6 months  Depression Screening:   PHQ-2 Score: 0      Fall Risk Screening: In the past year, patient has experienced: no history of falling in past year      Urinary Incontinence Screening:   Patient has not leaked urine accidently in the last six months  self cath    Home Safety:  Patient has trouble with stairs inside or outside of their home  Patient has working smoke alarms and has working carbon monoxide detector  Home safety hazards include: none  Nutrition:   Current diet is Regular       Medications:   Patient is not currently taking any over-the-counter supplements  Patient is able to manage medications  Activities of Daily Living (ADLs)/Instrumental Activities of Daily Living (IADLs):   Walk and transfer into and out of bed and chair?: Yes  Dress and groom yourself?: Yes    Bathe or shower yourself?: Yes    Feed yourself? Yes  Do your laundry/housekeeping?: Yes  Manage your money, pay your bills and track your expenses?: Yes  Make your own meals?: Yes    Do your own shopping?: Yes    Durable Medical Equipment Suppliers  [employer]    Previous Hospitalizations:   Any hospitalizations or ED visits within the last 12 months?: No      Advance Care Planning:   Living will: No    Durable POA for healthcare: Yes    Advanced directive: No    Advanced directive counseling given: Yes      Cognitive Screening:   Provider or family/friend/caregiver concerned regarding cognition?: No    PREVENTIVE SCREENINGS      Cardiovascular Screening:    General: Screening Not Indicated and History Lipid Disorder      Diabetes Screening:     General: Screening Current      Colorectal Cancer Screening:     General: Risks and Benefits Discussed    Due for: Cologuard      Breast Cancer Screening:     General: Screening Current      Cervical Cancer Screening:    General: Screening Current      Osteoporosis Screening:    General: Screening Not Indicated and History Osteoporosis      Abdominal Aortic Aneurysm (AAA) Screening:        General: Screening Not Indicated      Lung Cancer Screening:     General: Screening Not Indicated      Hepatitis C Screening:    General: Screening Current    Screening, Brief Intervention, and Referral to Treatment (SBIRT)    Screening  Typical number of drinks in a day: 0  Typical number of drinks in a week: 0  Interpretation: Low risk drinking behavior  AUDIT-C Screenin) How often did you have a drink containing alcohol in the past year? never  2) How many drinks did you have on a typical day when you were drinking in the past year? 0  3) How often did you have 6 or more drinks on one occasion in the past year? never    AUDIT-C Score: 0  Interpretation: Score 0-2 (female): Negative screen for alcohol misuse    Single Item Drug Screening:  How often have you used an illegal drug (including marijuana) or a prescription medication for non-medical reasons in the past year? never    Single Item Drug Screen Score: 0  Interpretation: Negative screen for possible drug use disorder    No exam data present     Physical Exam:     /80 (BP Location: Left arm, Patient Position: Sitting, Cuff Size: Adult)   Pulse 73   Temp 97 6 °F (36 4 °C) (Temporal)   Resp 16   SpO2 96%     Physical Exam     Ree Folds, DO

## 2022-11-13 PROBLEM — R26.9 GAIT DISTURBANCE: Status: ACTIVE | Noted: 2022-11-07

## 2022-11-13 PROBLEM — E78.5 DYSLIPIDEMIA: Status: ACTIVE | Noted: 2020-03-04

## 2022-11-13 NOTE — PROGRESS NOTES
Answers for HPI/ROS submitted by the patient on 11/4/2022  How would you rate your overall health?: fair  Compared to last year, how is your physical health?: same  In general, how satisfied are you with your life?: very satisfied  Compared to last year, how is your eyesight?: same  Compared to last year, how is your hearing?: same  Compared to last year, how is your emotional/mental health?: same  How often is anger a problem for you?: never, rarely  How often do you feel unusually tired/fatigued?: sometimes  In the past 7 days, how much pain have you experienced?: none  In the past 6 months, have you lost or gained 10 pounds without trying?: No  One or more falls in the last year: No  In the past 6 months, have you accidentally leaked urine?: No  Additional Comments: self cath  Do you have trouble with the stairs inside or outside your home?: Yes  Does your home have working smoke alarms?: Yes  Does your home have a carbon monoxide monitor?: Yes  Which safety hazards (if any) have you experienced in your home? Please select all that apply : none  How would you describe your current diet?  Please select all that apply : Regular  In addition to prescription medications, are you taking any over-the-counter supplements?: No  Can you manage your medications?: Yes  Are you currently taking any opioid medications?: No  Can you walk and transfer into and out of your bed and chair?: Yes  Can you dress and groom yourself?: Yes  Can you bathe or shower yourself?: Yes  Can you feed yourself?: Yes  Can you do your laundry/ housekeeping?: Yes  Can you manage your money, pay your bills, and track your expenses?: Yes  Can you make your own meals?: Yes  Can you do your own shopping?: Yes  Please list your DME (Durable Medical Equipment) supplier, if you use one : [employer]  Within the last 12 months, have you had any hospitalizations or Emergency Department visits?: No  Do you have a living will?: No  Do you have a Durable POA (Power of ) for healthcare decisions?: No  Do you have an Advanced Directive for end of life decisions?: No  How often have you used an illegal drug (including marijuana) or a prescription medication for non-medical reasons in the past year?: never  What is the typical number of drinks you consume in a day?: 0  What is the typical number of drinks you consume in a week?: 0  How often did you have a drink containing alcohol in the past year?: never  How many drinks did you have on a typical day  when you were drinking in the past year?: 0  How often did you have 6 or more drinks on one occasion in the past year?: never      Assessment/Plan:  Blood pressure today stable  On higher dose of olmesartan  Will continue to watch  Up-to-date with Neurology regarding multiple sclerosis  Refills for antibiotics for intermittent UTI symptoms and bladder dysfunction  Will try to get prior authorization for her Gelnique  She has been on this regimen for for quite some time  Has had full urogyn workup  This current regimen works well for her  Generalized edema is stable  LDL cholesterol stable  CMP stable  Flu vaccine and COVID vaccine up-to-date  Mammography up-to-date  Recommend recheck Q year as she prefers not to come every 6 months  No problem-specific Assessment & Plan notes found for this encounter  Diagnoses and all orders for this visit:    UTI symptoms  -     nitrofurantoin (MACRODANTIN) 100 mg capsule; Take every other day and post- coital    Primary hypertension  -     olmesartan (BENICAR) 40 mg tablet; Take 1 tablet (40 mg total) by mouth daily    Multiple sclerosis (HCC)    Bladder dysfunction  -     oxybutynin (DITROPAN XL) 15 MG 24 hr tablet; Take 1 tablet (15 mg total) by mouth daily    Hyperlipidemia, unspecified hyperlipidemia type  -     simvastatin (ZOCOR) 20 mg tablet;  Take 1 tablet (20 mg total) by mouth daily at bedtime    Generalized edema  -     furosemide (LASIX) 40 mg tablet; Use daily as needed for edema    Colon cancer screening  -     Cologuard        1  UTI symptoms  nitrofurantoin (MACRODANTIN) 100 mg capsule   2  Primary hypertension  olmesartan (BENICAR) 40 mg tablet   3  Multiple sclerosis ([address] )     4  Bladder dysfunction  oxybutynin (DITROPAN XL) 15 MG 24 hr tablet   5  Hyperlipidemia, unspecified hyperlipidemia type  simvastatin (ZOCOR) 20 mg tablet   6  Generalized edema  furosemide (LASIX) 40 mg tablet   7  Colon cancer screening  Cologuard       Subjective:        Patient ID: Lavell Beckham is a 65 y o  female  Chief Complaint   Patient presents with   • Medicare Wellness Visit       Patient here for yearly evaluation  Medicare wellness  Labs done  Blood pressure stable  Has MS  Up-to-date with her neurologist   Gets occasional swelling of her lower extremities  Overall stable      The following portions of the patient's history were reviewed and updated as appropriate: past medical history, past surgical history and problem list       Review of Systems   Constitutional: Negative for appetite change, fatigue, fever and unexpected weight change  HENT: Negative for congestion, ear pain, postnasal drip, rhinorrhea, sinus pressure, sinus pain and sore throat  Eyes: Negative for redness and visual disturbance  Respiratory: Negative for chest tightness and shortness of breath  Cardiovascular: Positive for leg swelling  Negative for chest pain and palpitations  Gastrointestinal: Negative for abdominal distention, abdominal pain, diarrhea and nausea  Endocrine: Negative for cold intolerance and heat intolerance  Genitourinary: Negative for dysuria and hematuria  Does self catheterization  Musculoskeletal: Positive for gait problem  Skin: Negative for pallor and rash  Neurological: Negative for dizziness, tremors, weakness, light-headedness and headaches  Psychiatric/Behavioral: Negative for behavioral problems   The patient is not nervous/anxious  Objective:  /80 (BP Location: Left arm, Patient Position: Sitting, Cuff Size: Adult)   Pulse 73   Temp 97 6 °F (36 4 °C) (Temporal)   Resp 16   SpO2 96%        Physical Exam  Vitals and nursing note reviewed  Constitutional:       General: She is not in acute distress  Appearance: She is not diaphoretic  HENT:      Head: Normocephalic and atraumatic  Eyes:      General: No scleral icterus  Conjunctiva/sclera: Conjunctivae normal       Pupils: Pupils are equal, round, and reactive to light  Neck:      Thyroid: No thyromegaly  Vascular: No carotid bruit  Cardiovascular:      Rate and Rhythm: Normal rate and regular rhythm  Pulses:           Carotid pulses are 0 on the right side and 0 on the left side  Heart sounds: Normal heart sounds  No murmur heard  Pulmonary:      Effort: Pulmonary effort is normal  No respiratory distress  Breath sounds: Normal breath sounds  No wheezing  Abdominal:      General: There is no distension  Palpations: Abdomen is soft  Musculoskeletal:      Cervical back: Normal range of motion and neck supple  No tenderness  Right lower leg: Edema present  Left lower leg: Edema present  Comments: Trace bilateral pretibial edema   Lymphadenopathy:      Cervical: No cervical adenopathy  Skin:     General: Skin is warm  Coloration: Skin is not pale  Neurological:      Mental Status: She is alert and oriented to person, place, and time  Cranial Nerves: No cranial nerve deficit  Motor: Weakness present  Coordination: Coordination abnormal       Gait: Gait abnormal       Deep Tendon Reflexes: Reflexes are normal and symmetric  Psychiatric:         Mood and Affect: Mood normal          Behavior: Behavior normal          Thought Content:  Thought content normal          Judgment: Judgment normal

## 2022-11-14 NOTE — TELEPHONE ENCOUNTER
As a follow-up, a second attempt has been made for outreach via fax to facility  , please see Contacts section for details      Thank you  Nikki Velazco MA

## 2022-11-16 ENCOUNTER — TELEPHONE (OUTPATIENT)
Dept: FAMILY MEDICINE CLINIC | Facility: CLINIC | Age: 62
End: 2022-11-16

## 2022-11-17 ENCOUNTER — TELEPHONE (OUTPATIENT)
Dept: ADMINISTRATIVE | Facility: OTHER | Age: 62
End: 2022-11-17

## 2022-11-17 NOTE — TELEPHONE ENCOUNTER
Upon review of the In Basket request we have found this is a duplicate request and no further action is needed  This request was completed upon initial request, the patient chart is up to date, and this message will now be closed  Mammo was not done until 11/16/2022 - request was sent today to a different QA    Any additional questions or concerns should be emailed to the Practice Liaisons via the appropriate education email address, please do not reply via In Basket      Thank you  Edin Bautista MA

## 2022-11-17 NOTE — TELEPHONE ENCOUNTER
Upon review of the In Basket request we were able to locate, review, and update the patient chart as requested for Mammogram     Any additional questions or concerns should be emailed to the Practice Liaisons via the appropriate education email address, please do not reply via In Basket      Thank you  Jeronimo Sit

## 2022-11-20 DIAGNOSIS — F51.01 PRIMARY INSOMNIA: ICD-10-CM

## 2022-11-20 RX ORDER — ZOLPIDEM TARTRATE 10 MG/1
10 TABLET ORAL
Qty: 90 TABLET | Refills: 0 | Status: SHIPPED | OUTPATIENT
Start: 2022-11-20

## 2022-11-28 LAB — COLOGUARD RESULT REPORTABLE: POSITIVE

## 2022-12-08 DIAGNOSIS — R19.5 POSITIVE COLORECTAL CANCER SCREENING USING COLOGUARD TEST: Primary | ICD-10-CM

## 2022-12-19 DIAGNOSIS — I10 PRIMARY HYPERTENSION: Primary | ICD-10-CM

## 2022-12-26 DIAGNOSIS — R79.89 ABNORMAL TSH: ICD-10-CM

## 2022-12-26 DIAGNOSIS — R53.1 WEAKNESS: ICD-10-CM

## 2022-12-26 DIAGNOSIS — E03.9 HYPOTHYROIDISM, UNSPECIFIED TYPE: ICD-10-CM

## 2022-12-26 DIAGNOSIS — I10 PRIMARY HYPERTENSION: Primary | ICD-10-CM

## 2022-12-26 RX ORDER — HYDROCHLOROTHIAZIDE 25 MG/1
25 TABLET ORAL DAILY
Qty: 90 TABLET | Refills: 1 | Status: SHIPPED | OUTPATIENT
Start: 2022-12-26

## 2022-12-27 ENCOUNTER — TELEPHONE (OUTPATIENT)
Dept: FAMILY MEDICINE CLINIC | Facility: CLINIC | Age: 62
End: 2022-12-27

## 2022-12-30 LAB
APPEARANCE UR: ABNORMAL
BACTERIA URNS QL MICRO: NORMAL
BASOPHILS # BLD AUTO: 0 X10E3/UL (ref 0–0.2)
BASOPHILS NFR BLD AUTO: 0 %
BILIRUB UR QL STRIP: NEGATIVE
BUN SERPL-MCNC: 21 MG/DL (ref 8–27)
BUN/CREAT SERPL: 26 (ref 12–28)
CALCIUM SERPL-MCNC: ABNORMAL MG/DL
CASTS URNS QL MICRO: NORMAL /LPF
CHLORIDE SERPL-SCNC: 95 MMOL/L (ref 96–106)
CO2 SERPL-SCNC: 20 MMOL/L (ref 20–29)
COLOR UR: YELLOW
CREAT SERPL-MCNC: 0.81 MG/DL (ref 0.57–1)
EGFR: 83 ML/MIN/1.73
EOSINOPHIL # BLD AUTO: 0 X10E3/UL (ref 0–0.4)
EOSINOPHIL NFR BLD AUTO: 0 %
EPI CELLS #/AREA URNS HPF: NORMAL /HPF (ref 0–10)
ERYTHROCYTE [DISTWIDTH] IN BLOOD BY AUTOMATED COUNT: 13.4 % (ref 11.7–15.4)
GLUCOSE SERPL-MCNC: 105 MG/DL (ref 70–99)
GLUCOSE UR QL: NEGATIVE
HCT VFR BLD AUTO: 35.5 % (ref 34–46.6)
HGB BLD-MCNC: 11.9 G/DL (ref 11.1–15.9)
HGB UR QL STRIP: NEGATIVE
IMM GRANULOCYTES # BLD: 0 X10E3/UL (ref 0–0.1)
IMM GRANULOCYTES NFR BLD: 0 %
KETONES UR QL STRIP: NEGATIVE
LEUKOCYTE ESTERASE UR QL STRIP: NEGATIVE
LYMPHOCYTES # BLD AUTO: 1.2 X10E3/UL (ref 0.7–3.1)
LYMPHOCYTES NFR BLD AUTO: 17 %
MCH RBC QN AUTO: 30.3 PG (ref 26.6–33)
MCHC RBC AUTO-ENTMCNC: 33.5 G/DL (ref 31.5–35.7)
MCV RBC AUTO: 90 FL (ref 79–97)
MICRO URNS: ABNORMAL
MICRO URNS: ABNORMAL
MONOCYTES # BLD AUTO: 0.3 X10E3/UL (ref 0.1–0.9)
MONOCYTES NFR BLD AUTO: 5 %
NEUTROPHILS # BLD AUTO: 5.4 X10E3/UL (ref 1.4–7)
NEUTROPHILS NFR BLD AUTO: 78 %
NITRITE UR QL STRIP: NEGATIVE
PH UR STRIP: 8 [PH] (ref 5–7.5)
PLATELET # BLD AUTO: 330 X10E3/UL (ref 150–450)
POTASSIUM SERPL-SCNC: ABNORMAL MMOL/L
PROT UR QL STRIP: NEGATIVE
RBC # BLD AUTO: 3.93 X10E6/UL (ref 3.77–5.28)
RBC #/AREA URNS HPF: NORMAL /HPF (ref 0–2)
SL AMB URINALYSIS REFLEX: ABNORMAL
SODIUM SERPL-SCNC: 140 MMOL/L (ref 134–144)
SP GR UR: 1.02 (ref 1–1.03)
TSH SERPL DL<=0.005 MIU/L-ACNC: 2.52 UIU/ML (ref 0.45–4.5)
UROBILINOGEN UR STRIP-ACNC: 0.2 MG/DL (ref 0.2–1)
WBC # BLD AUTO: 7 X10E3/UL (ref 3.4–10.8)
WBC #/AREA URNS HPF: NORMAL /HPF (ref 0–5)

## 2023-01-05 ENCOUNTER — TELEPHONE (OUTPATIENT)
Dept: OTHER | Facility: OTHER | Age: 63
End: 2023-01-05

## 2023-01-05 DIAGNOSIS — I10 PRIMARY HYPERTENSION: Primary | ICD-10-CM

## 2023-01-05 DIAGNOSIS — E83.52 HYPERCALCEMIA: Primary | ICD-10-CM

## 2023-01-05 LAB
BUN SERPL-MCNC: 25 MG/DL (ref 8–27)
BUN/CREAT SERPL: 23 (ref 12–28)
CALCIUM SERPL-MCNC: 13.1 MG/DL (ref 8.7–10.3)
CHLORIDE SERPL-SCNC: 97 MMOL/L (ref 96–106)
CO2 SERPL-SCNC: 31 MMOL/L (ref 20–29)
CREAT SERPL-MCNC: 1.11 MG/DL (ref 0.57–1)
EGFR: 57 ML/MIN/1.73
GLUCOSE SERPL-MCNC: 101 MG/DL (ref 70–99)
POTASSIUM SERPL-SCNC: 3.6 MMOL/L (ref 3.5–5.2)
SODIUM SERPL-SCNC: 141 MMOL/L (ref 134–144)

## 2023-01-05 RX ORDER — AMLODIPINE BESYLATE 2.5 MG/1
2.5 TABLET ORAL DAILY
Qty: 30 TABLET | Refills: 5 | Status: SHIPPED | OUTPATIENT
Start: 2023-01-05

## 2023-01-05 NOTE — TELEPHONE ENCOUNTER
Lab Result: Critical Lab:Calcium 13 1   Date/Time Drawn: 01/04/23 at 1214   Ordering Provider: Dr Natalie Doan Name: [phone]/[id number] from [address] [place] [address] #[id number]       The following critical/stat result was read back to the lab as stated above and [employer] to the on-call provider  The provider confirmed receipt of the message

## 2023-01-06 ENCOUNTER — TELEPHONE (OUTPATIENT)
Dept: FAMILY MEDICINE CLINIC | Facility: CLINIC | Age: 63
End: 2023-01-06

## 2023-01-06 ENCOUNTER — TELEPHONE (OUTPATIENT)
Dept: OTHER | Facility: OTHER | Age: 63
End: 2023-01-06

## 2023-01-06 LAB
25(OH)D3+25(OH)D2 SERPL-MCNC: 56.8 NG/ML (ref 30–100)
BUN SERPL-MCNC: 23 MG/DL (ref 8–27)
BUN/CREAT SERPL: 22 (ref 12–28)
CALCIUM SERPL-MCNC: 14.5 MG/DL (ref 8.7–10.3)
CHLORIDE SERPL-SCNC: 97 MMOL/L (ref 96–106)
CO2 SERPL-SCNC: 28 MMOL/L (ref 20–29)
CREAT SERPL-MCNC: 1.05 MG/DL (ref 0.57–1)
EGFR: 60 ML/MIN/1.73
GLUCOSE SERPL-MCNC: 105 MG/DL (ref 70–99)
POTASSIUM SERPL-SCNC: 3.5 MMOL/L (ref 3.5–5.2)
PTH-INTACT SERPL-MCNC: 9 PG/ML (ref 15–65)
SODIUM SERPL-SCNC: 141 MMOL/L (ref 134–144)

## 2023-01-06 NOTE — TELEPHONE ENCOUNTER
Lab Result: Critical Result: Calcium 14 5   Date/Time Drawn: 01- at 1353   Ordering Provider: Dr Naida Ferguson Name: [phone]/[id number] from [address] [place] [address] # [id number]       The following critical/stat result was read back to the lab as stated above and [employer] to the on-call provider  The provider confirmed receipt of the message

## 2023-01-06 NOTE — TELEPHONE ENCOUNTER
Called [facility] per Dr Catherine Owens for patients lab results faxed to office  Spoke with rep Lisbet Arredondo he will fax results from 01/05/2023 asap

## 2023-01-09 DIAGNOSIS — E83.52 HYPERCALCEMIA: Primary | ICD-10-CM

## 2023-01-12 DIAGNOSIS — N31.9 BLADDER DYSFUNCTION: ICD-10-CM

## 2023-01-12 RX ORDER — OXYBUTYNIN CHLORIDE 5 MG/1
5 TABLET, EXTENDED RELEASE ORAL DAILY
Qty: 90 TABLET | Refills: 1 | Status: SHIPPED | OUTPATIENT
Start: 2023-01-12 | End: 2023-02-16 | Stop reason: SDUPTHER

## 2023-01-19 DIAGNOSIS — I10 PRIMARY HYPERTENSION: ICD-10-CM

## 2023-01-19 RX ORDER — AMLODIPINE BESYLATE 2.5 MG/1
2.5 TABLET ORAL DAILY
Qty: 90 TABLET | Refills: 1 | Status: SHIPPED | OUTPATIENT
Start: 2023-01-19

## 2023-01-19 RX ORDER — OLMESARTAN MEDOXOMIL 40 MG/1
40 TABLET ORAL DAILY
Qty: 90 TABLET | Refills: 1 | Status: SHIPPED | OUTPATIENT
Start: 2023-01-19

## 2023-01-26 DIAGNOSIS — E78.5 HYPERLIPIDEMIA, UNSPECIFIED HYPERLIPIDEMIA TYPE: Primary | ICD-10-CM

## 2023-01-26 RX ORDER — ROSUVASTATIN CALCIUM 5 MG/1
5 TABLET, COATED ORAL DAILY
Qty: 90 TABLET | Refills: 3 | Status: SHIPPED | OUTPATIENT
Start: 2023-01-26

## 2023-02-15 ENCOUNTER — TELEPHONE (OUTPATIENT)
Dept: FAMILY MEDICINE CLINIC | Facility: CLINIC | Age: 63
End: 2023-02-15

## 2023-02-15 NOTE — TELEPHONE ENCOUNTER
----- Message from Otoniel Duarte sent at 11/17/2022  7:18 AM EST -----  Regarding: care gap request - mammo  11/17/22 7:18 AM    Hello, our patient attached above has had Mammogram completed/performed  Please assist in updating the patient chart by pulling the Care Everywhere (CE) document  The date of service is 11/16/2022       Thank you,  [facility] Paracentesis done. Removed 6675 ml. Albumin 50 grams ordered. Patient AAOx3, no distress noted, respirations even and unlabored.   Vitals:    02/15/23 1613   BP: (!) 107/55   Pulse: (!) 112   Resp: 12   Temp:

## 2023-02-16 DIAGNOSIS — N31.9 BLADDER DYSFUNCTION: ICD-10-CM

## 2023-02-16 RX ORDER — OXYBUTYNIN CHLORIDE 10 MG/1
10 TABLET, EXTENDED RELEASE ORAL DAILY
Qty: 90 TABLET | Refills: 3 | Status: SHIPPED | OUTPATIENT
Start: 2023-02-16

## 2023-03-14 DIAGNOSIS — F51.01 PRIMARY INSOMNIA: ICD-10-CM

## 2023-03-14 RX ORDER — ZOLPIDEM TARTRATE 10 MG/1
10 TABLET ORAL
Qty: 90 TABLET | Refills: 0 | Status: SHIPPED | OUTPATIENT
Start: 2023-03-14

## 2023-05-08 DIAGNOSIS — R39.9 UTI SYMPTOMS: ICD-10-CM

## 2023-05-08 RX ORDER — NITROFURANTOIN MACROCRYSTALS 100 MG/1
CAPSULE ORAL
Qty: 72 CAPSULE | Refills: 3 | Status: SHIPPED | OUTPATIENT
Start: 2023-05-08

## 2023-05-31 DIAGNOSIS — E78.5 HYPERLIPIDEMIA, UNSPECIFIED HYPERLIPIDEMIA TYPE: ICD-10-CM

## 2023-05-31 RX ORDER — ROSUVASTATIN CALCIUM 10 MG/1
10 TABLET, COATED ORAL DAILY
Qty: 90 TABLET | Refills: 3 | Status: SHIPPED | OUTPATIENT
Start: 2023-05-31

## 2023-06-07 DIAGNOSIS — F51.01 PRIMARY INSOMNIA: ICD-10-CM

## 2023-06-07 LAB
ALBUMIN SERPL-MCNC: 4.6 G/DL (ref 3.8–4.8)
ALBUMIN/GLOB SERPL: 2 {RATIO} (ref 1.2–2.2)
ALP SERPL-CCNC: 36 IU/L (ref 44–121)
ALT SERPL-CCNC: 25 IU/L (ref 0–32)
AST SERPL-CCNC: 25 IU/L (ref 0–40)
BILIRUB SERPL-MCNC: <0.2 MG/DL (ref 0–1.2)
BUN SERPL-MCNC: 18 MG/DL (ref 8–27)
BUN/CREAT SERPL: 24 (ref 12–28)
CALCIUM SERPL-MCNC: 9.5 MG/DL (ref 8.7–10.3)
CHLORIDE SERPL-SCNC: 101 MMOL/L (ref 96–106)
CHOLEST SERPL-MCNC: 175 MG/DL (ref 100–199)
CK SERPL-CCNC: 68 U/L (ref 32–182)
CO2 SERPL-SCNC: 21 MMOL/L (ref 20–29)
CREAT SERPL-MCNC: 0.74 MG/DL (ref 0.57–1)
EGFR: 91 ML/MIN/1.73
GLOBULIN SER-MCNC: 2.3 G/DL (ref 1.5–4.5)
GLUCOSE SERPL-MCNC: 79 MG/DL (ref 70–99)
HDLC SERPL-MCNC: 79 MG/DL
LDLC SERPL CALC-MCNC: 79 MG/DL (ref 0–99)
POTASSIUM SERPL-SCNC: 4.1 MMOL/L (ref 3.5–5.2)
PROT SERPL-MCNC: 6.9 G/DL (ref 6–8.5)
SL AMB VLDL CHOLESTEROL CALC: 17 MG/DL (ref 5–40)
SODIUM SERPL-SCNC: 137 MMOL/L (ref 134–144)
TRIGL SERPL-MCNC: 95 MG/DL (ref 0–149)

## 2023-06-08 RX ORDER — ZOLPIDEM TARTRATE 10 MG/1
10 TABLET ORAL
Qty: 90 TABLET | Refills: 0 | Status: SHIPPED | OUTPATIENT
Start: 2023-06-08

## 2023-07-05 DIAGNOSIS — I10 PRIMARY HYPERTENSION: ICD-10-CM

## 2023-07-05 RX ORDER — AMLODIPINE BESYLATE 2.5 MG/1
TABLET ORAL
Qty: 90 TABLET | Refills: 1 | Status: SHIPPED | OUTPATIENT
Start: 2023-07-05

## 2023-07-05 RX ORDER — OLMESARTAN MEDOXOMIL 40 MG/1
TABLET ORAL
Qty: 90 TABLET | Refills: 1 | Status: SHIPPED | OUTPATIENT
Start: 2023-07-05

## 2023-08-29 DIAGNOSIS — R39.9 UTI SYMPTOMS: ICD-10-CM

## 2023-08-29 DIAGNOSIS — F51.01 PRIMARY INSOMNIA: ICD-10-CM

## 2023-08-29 RX ORDER — NITROFURANTOIN MACROCRYSTALS 100 MG/1
CAPSULE ORAL
Qty: 60 CAPSULE | Refills: 3 | Status: SHIPPED | OUTPATIENT
Start: 2023-08-29

## 2023-08-29 RX ORDER — ZOLPIDEM TARTRATE 10 MG/1
10 TABLET ORAL
Qty: 90 TABLET | Refills: 0 | Status: SHIPPED | OUTPATIENT
Start: 2023-08-29

## 2023-11-10 DIAGNOSIS — E78.5 HYPERLIPIDEMIA, UNSPECIFIED HYPERLIPIDEMIA TYPE: Primary | ICD-10-CM

## 2023-11-13 ENCOUNTER — RA CDI HCC (OUTPATIENT)
Dept: OTHER | Facility: HOSPITAL | Age: 63
End: 2023-11-13

## 2023-11-17 ENCOUNTER — OFFICE VISIT (OUTPATIENT)
Dept: FAMILY MEDICINE CLINIC | Facility: CLINIC | Age: 63
End: 2023-11-17
Payer: MEDICARE

## 2023-11-17 VITALS
DIASTOLIC BLOOD PRESSURE: 80 MMHG | SYSTOLIC BLOOD PRESSURE: 116 MMHG | HEIGHT: 65 IN | TEMPERATURE: 96.1 F | WEIGHT: 128.1 LBS | OXYGEN SATURATION: 98 % | RESPIRATION RATE: 16 BRPM | HEART RATE: 67 BPM | BODY MASS INDEX: 21.34 KG/M2

## 2023-11-17 DIAGNOSIS — N32.9 BLADDER DISORDER: ICD-10-CM

## 2023-11-17 DIAGNOSIS — R60.1 GENERALIZED EDEMA: ICD-10-CM

## 2023-11-17 DIAGNOSIS — G35 MULTIPLE SCLEROSIS (HCC): ICD-10-CM

## 2023-11-17 DIAGNOSIS — Z12.31 ENCOUNTER FOR SCREENING MAMMOGRAM FOR BREAST CANCER: Primary | ICD-10-CM

## 2023-11-17 DIAGNOSIS — N31.9 BLADDER DYSFUNCTION: ICD-10-CM

## 2023-11-17 DIAGNOSIS — R79.89 ABNORMAL TSH: ICD-10-CM

## 2023-11-17 DIAGNOSIS — R39.9 UTI SYMPTOMS: ICD-10-CM

## 2023-11-17 DIAGNOSIS — E78.00 PURE HYPERCHOLESTEROLEMIA: ICD-10-CM

## 2023-11-17 DIAGNOSIS — E78.5 HYPERLIPIDEMIA, UNSPECIFIED HYPERLIPIDEMIA TYPE: ICD-10-CM

## 2023-11-17 DIAGNOSIS — Z23 ENCOUNTER FOR IMMUNIZATION: ICD-10-CM

## 2023-11-17 DIAGNOSIS — F51.01 PRIMARY INSOMNIA: ICD-10-CM

## 2023-11-17 DIAGNOSIS — Z00.00 MEDICARE ANNUAL WELLNESS VISIT, SUBSEQUENT: ICD-10-CM

## 2023-11-17 DIAGNOSIS — I10 PRIMARY HYPERTENSION: ICD-10-CM

## 2023-11-17 DIAGNOSIS — G43.909 MIGRAINE WITHOUT STATUS MIGRAINOSUS, NOT INTRACTABLE, UNSPECIFIED MIGRAINE TYPE: ICD-10-CM

## 2023-11-17 PROCEDURE — G0439 PPPS, SUBSEQ VISIT: HCPCS | Performed by: FAMILY MEDICINE

## 2023-11-17 PROCEDURE — 99214 OFFICE O/P EST MOD 30 MIN: CPT | Performed by: FAMILY MEDICINE

## 2023-11-17 RX ORDER — ZOLPIDEM TARTRATE 10 MG/1
10 TABLET ORAL
Qty: 90 TABLET | Refills: 0 | Status: CANCELLED | OUTPATIENT
Start: 2023-11-17

## 2023-11-17 RX ORDER — FUROSEMIDE 40 MG/1
TABLET ORAL
Qty: 90 TABLET | Refills: 3 | Status: CANCELLED | OUTPATIENT
Start: 2023-11-17

## 2023-11-17 RX ORDER — OXYBUTYNIN CHLORIDE 15 MG/1
15 TABLET, EXTENDED RELEASE ORAL DAILY
Qty: 90 TABLET | Refills: 3 | Status: SHIPPED | OUTPATIENT
Start: 2023-11-17 | End: 2023-11-20 | Stop reason: SDUPTHER

## 2023-11-17 RX ORDER — BUTALBITAL, ACETAMINOPHEN AND CAFFEINE 50; 325; 40 MG/1; MG/1; MG/1
2 TABLET ORAL EVERY 6 HOURS PRN
Qty: 180 TABLET | Refills: 1 | Status: CANCELLED | OUTPATIENT
Start: 2023-11-17

## 2023-11-17 RX ORDER — OXYBUTYNIN CHLORIDE 15 MG/1
15 TABLET, EXTENDED RELEASE ORAL DAILY
Qty: 90 TABLET | Refills: 3 | Status: CANCELLED | OUTPATIENT
Start: 2023-11-17

## 2023-11-17 RX ORDER — ROSUVASTATIN CALCIUM 10 MG/1
10 TABLET, COATED ORAL DAILY
Qty: 90 TABLET | Refills: 3 | Status: CANCELLED | OUTPATIENT
Start: 2023-11-17

## 2023-11-17 RX ORDER — AMLODIPINE BESYLATE 2.5 MG/1
2.5 TABLET ORAL DAILY
Qty: 90 TABLET | Refills: 1 | Status: CANCELLED | OUTPATIENT
Start: 2023-11-17

## 2023-11-17 RX ORDER — OLMESARTAN MEDOXOMIL 40 MG/1
40 TABLET ORAL DAILY
Qty: 90 TABLET | Refills: 1 | Status: CANCELLED | OUTPATIENT
Start: 2023-11-17

## 2023-11-17 RX ORDER — BACLOFEN 20 MG/1
10 TABLET ORAL 3 TIMES DAILY
Qty: 270 TABLET | Refills: 3 | Status: CANCELLED | OUTPATIENT
Start: 2023-11-17

## 2023-11-17 RX ORDER — NITROFURANTOIN MACROCRYSTALS 100 MG/1
CAPSULE ORAL
Qty: 60 CAPSULE | Refills: 3 | Status: CANCELLED | OUTPATIENT
Start: 2023-11-17

## 2023-11-17 RX ORDER — DALFAMPRIDINE 10 MG/1
10 TABLET, FILM COATED, EXTENDED RELEASE ORAL 2 TIMES DAILY
Qty: 90 TABLET | Refills: 3 | Status: CANCELLED | OUTPATIENT
Start: 2023-11-17

## 2023-11-17 RX ORDER — TROSPIUM CHLORIDE ER 60 MG/1
60 CAPSULE ORAL
Qty: 90 CAPSULE | Refills: 3 | Status: CANCELLED | OUTPATIENT
Start: 2023-11-17

## 2023-11-17 NOTE — PATIENT INSTRUCTIONS
Medicare Preventive Visit Patient Instructions  Thank you for completing your Welcome to Medicare Visit or Medicare Annual Wellness Visit today. Your next wellness visit will be due in one year (11/17/2024). The screening/preventive services that you may require over the next 5-10 years are detailed below. Some tests may not apply to you based off risk factors and/or age. Screening tests ordered at today's visit but not completed yet may show as past due. Also, please note that scanned in results may not display below. Preventive Screenings:  Service Recommendations Previous Testing/Comments   Colorectal Cancer Screening  * Colonoscopy    * Fecal Occult Blood Test (FOBT)/Fecal Immunochemical Test (FIT)  * Fecal DNA/Cologuard Test  * Flexible Sigmoidoscopy Age: 43-73 years old   Colonoscopy: every 10 years (may be performed more frequently if at higher risk)  OR  FOBT/FIT: every 1 year  OR  Cologuard: every 3 years  OR  Sigmoidoscopy: every 5 years  Screening may be recommended earlier than age 39 if at higher risk for colorectal cancer. Also, an individualized decision between you and your healthcare provider will decide whether screening between the ages of 77-80 would be appropriate. Colonoscopy: 12/22/2022  FOBT/FIT: Not on file  Cologuard: 11/21/2022  Sigmoidoscopy: Not on file          Breast Cancer Screening Age: 36 years old  Frequency: every 1-2 years  Not required if history of left and right mastectomy Mammogram: 11/16/2022        Cervical Cancer Screening Between the ages of 21-29, pap smear recommended once every 3 years. Between the ages of 32-69, can perform pap smear with HPV co-testing every 5 years.    Recommendations may differ for women with a history of total hysterectomy, cervical cancer, or abnormal pap smears in past. Pap Smear: Not on file        Hepatitis C Screening Once for adults born between [address] [place] [address]  More frequently in patients at high risk for Hepatitis C Hep C Antibody: 07/25/2018        Diabetes Screening 1-2 times per year if you're at risk for diabetes or have pre-diabetes Fasting glucose: No results in last 5 years (No results in last 5 years)  A1C: No results in last 5 years (No results in last 5 years)      Cholesterol Screening Once every 5 years if you don't have a lipid disorder. May order more often based on risk factors. Lipid panel: 06/06/2023          Other Preventive Screenings Covered by Medicare:  Abdominal Aortic Aneurysm (AAA) Screening: covered once if your at risk. You're considered to be at risk if you have a family history of AAA. Lung Cancer Screening: covers low dose CT scan once per year if you meet all of the following conditions: (1) Age 48-67; (2) No signs or symptoms of lung cancer; (3) Current smoker or have quit smoking within the last 15 years; (4) You have a tobacco smoking history of at least 20 pack years (packs per day multiplied by number of years you smoked); (5) You get a written order from a healthcare provider. Glaucoma Screening: covered annually if you're considered high risk: (1) You have diabetes OR (2) Family history of glaucoma OR (3)  aged 48 and older OR (3)  American aged 72 and older  Osteoporosis Screening: covered every 2 years if you meet one of the following conditions: (1) You're estrogen deficient and at risk for osteoporosis based off medical history and other findings; (2) Have a vertebral abnormality; (3) On glucocorticoid therapy for more than 3 months; (4) Have primary hyperparathyroidism; (5) On osteoporosis medications and need to assess response to drug therapy. Last bone density test (DXA Scan): Not on file. HIV Screening: covered annually if you're between the age of 14-79. Also covered annually if you are younger than 13 and older than 72 with risk factors for HIV infection. For pregnant patients, it is covered up to 3 times per pregnancy.     Immunizations:  Immunization Recommendations Influenza Vaccine Annual influenza vaccination during flu season is recommended for all persons aged >= 6 months who do not have contraindications   Pneumococcal Vaccine   * Pneumococcal conjugate vaccine = PCV13 (Prevnar 13), PCV15 (Vaxneuvance), PCV20 (Prevnar 20)  * Pneumococcal polysaccharide vaccine = PPSV23 (Pneumovax) Adults 63-06 yo with certain risk factors or if 69+ yo  If never received any pneumonia vaccine: recommend Prevnar 20 (PCV20)  Give PCV20 if previously received 1 dose of PCV13 or PPSV23   Hepatitis B Vaccine 3 dose series if at intermediate or high risk (ex: diabetes, end stage renal disease, liver disease)   Respiratory syncytial virus (RSV) Vaccine - COVERED BY MEDICARE PART D  * RSVPreF3 (Arexvy) CDC recommends that adults 61years of age and older may receive a single dose of RSV vaccine using shared clinical decision-making (SCDM)   Tetanus (Td) Vaccine - COST NOT COVERED BY MEDICARE PART B Following completion of primary series, a booster dose should be given every 10 years to maintain immunity against tetanus. Td may also be given as tetanus wound prophylaxis. Tdap Vaccine - COST NOT COVERED BY MEDICARE PART B Recommended at least once for all adults. For pregnant patients, recommended with each pregnancy. Shingles Vaccine (Shingrix) - COST NOT COVERED BY MEDICARE PART B  2 shot series recommended in those 19 years and older who have or will have weakened immune systems or those 50 years and older     Health Maintenance Due:      Topic Date Due   • DXA SCAN  Never done   • Breast Cancer Screening: Mammogram  11/16/2023   • HIV Screening  11/21/2025 (Originally 2/26/1975)   • Colorectal Cancer Screening  11/21/2025   • Hepatitis C Screening  Completed     Immunizations Due:      Topic Date Due   • COVID-19 Vaccine (1) Never done   • Influenza Vaccine (1) 09/01/2023     Advance Directives   What are advance directives?   Advance directives are legal documents that state your wishes and plans for medical care. These plans are made ahead of time in case you lose your ability to make decisions for yourself. Advance directives can apply to any medical decision, such as the treatments you want, and if you want to donate organs. What are the types of advance directives? There are many types of advance directives, and each state has rules about how to use them. You may choose a combination of any of the following:  Living will: This is a written record of the treatment you want. You can also choose which treatments you do not want, which to limit, and which to stop at a certain time. This includes surgery, medicine, IV fluid, and tube feedings. Durable power of  for healthcare [facility]): This is a written record that states who you want to make healthcare choices for you when you are unable to make them for yourself. This person, called a proxy, is usually a family member or a friend. You may choose more than 1 proxy. Do not resuscitate (DNR) order:  A DNR order is used in case your heart stops beating or you stop breathing. It is a request not to have certain forms of treatment, such as CPR. A DNR order may be included in other types of advance directives. Medical directive: This covers the care that you want if you are in a coma, near death, or unable to make decisions for yourself. You can list the treatments you want for each condition. Treatment may include pain medicine, surgery, blood transfusions, dialysis, IV or tube feedings, and a ventilator (breathing machine). Values history: This document has questions about your views, beliefs, and how you feel and think about life. This information can help others choose the care that you would choose. Why are advance directives important? An advance directive helps you control your care. Although spoken wishes may be used, it is better to have your wishes written down. Spoken wishes can be misunderstood, or not followed. Treatments may be given even if you do not want them. An advance directive may make it easier for your family to make difficult choices about your care. © Copyright [employer] 2018 Information is for End User's use only and may not be sold, redistributed or otherwise used for commercial purposes.  All illustrations and images included in CareNotes® are the copyrighted property of [employer].D.A[employer]M., Inc. or [address] [place] [address]

## 2023-11-17 NOTE — PROGRESS NOTES
Assessment and Plan:   Medicare wellness completed. Has living will and power of  blood pressure stable. Not orthostatic. Maureen Chen Continue current blood pressure medicine. Vaccines discussed. Typically gets flu and COVID. Up-to-date with neurology, GI and GYN. .  Last lipid shows total cholesterol 175 with an LDL of 79. Continue statin. CMP normal.  Due for lipid panel. Orders given for somewhere in December and then repeat every 6 months. Aware that I am retiring. She lives in [place]. Will switch over to primary care in the [place] area. Very good friend and godmother to my son. Continue to wish for hopeful wellness and happiness. Problem List Items Addressed This Visit    None  Visit Diagnoses       Encounter for screening mammogram for breast cancer    -  Primary    Medicare annual wellness visit, subsequent                Depression Screening and Follow-up Plan: Patient was screened for depression during today's encounter. They screened negative with a PHQ-2 score of 0. Preventive health issues were discussed with patient, and age appropriate screening tests were ordered as noted in patient's After Visit Summary. Personalized health advice and appropriate referrals for health education or preventive services given if needed, as noted in patient's After Visit Summary. History of Present Illness:     Patient presents for a Medicare Wellness Visit    Medicare wellness. History of MS. Follows closely with her neurologist.  Up-to-date with GYN       Patient Care Team:  Harriet Cox DO as PCP - General     Review of Systems:     Review of Systems   Constitutional:  Negative for appetite change, fatigue, fever and unexpected weight change. HENT:  Negative for congestion, ear pain, postnasal drip, rhinorrhea, sinus pressure, sinus pain and sore throat. Eyes:  Negative for redness and visual disturbance. Respiratory:  Negative for chest tightness and shortness of breath. Cardiovascular:  Negative for chest pain, palpitations and leg swelling. Gastrointestinal:  Negative for abdominal distention, abdominal pain, diarrhea and nausea. Endocrine: Negative for cold intolerance and heat intolerance. Genitourinary:  Negative for dysuria and hematuria. Has to self cath   Musculoskeletal:  Positive for gait problem. Skin:  Negative for pallor and rash. Neurological:  Positive for weakness. Negative for dizziness, tremors, light-headedness and headaches. Psychiatric/Behavioral:  Negative for behavioral problems. The patient is not nervous/anxious. Problem List:     Patient Active Problem List   Diagnosis    Ambulatory dysfunction    Urinary retention    Edema    Hyperlipidemia    Insomnia    Migraine headache    Multiple sclerosis (HCC)    Osteoporosis    Urinary incontinence    Vitamin D deficiency    Essential hypertension    Infection due to urethral catheter     Increased frequency of urination    Lichen sclerosus    Vulval lesion    Age-related osteoporosis without current pathological fracture    Dyslipidemia    Gait disturbance      Past Medical and Surgical History:     Past Medical History:   Diagnosis Date    Benign essential hypertension     Last Assessed 12/15/2016    Osteopenia     Resolved 10/19/2017     Past Surgical History:   Procedure Laterality Date     SECTION      COLONOSCOPY      baseline age 49.  Myrle Diones    HYSTERECTOMY      TUBAL LIGATION        Family History:     Family History   Problem Relation Age of Onset    Lung cancer Mother     Alcohol abuse Father     Colon cancer Sister         Transverse - stage 1     Heart attack Family         Acute MI      Social History:     Social History     Socioeconomic History    Marital status: /Civil Union     Spouse name: Not on file    Number of children: Not on file    Years of education: Not on file    Highest education level: Not on file   Occupational History    Not on file   Tobacco Use    Smoking status: Never    Smokeless tobacco: Never   Vaping Use    Vaping Use: Never used   Substance and Sexual Activity    Alcohol use: Yes     Comment: social    Drug use: No    Sexual activity: Not on file   Other Topics Concern    Not on file   Social History Narrative    Not on file     Social Determinants of Health     Financial Resource Strain: Low Risk  (11/4/2022)    Overall Financial Resource Strain (CARDIA)     Difficulty of Paying Living Expenses: Not hard at all   Food Insecurity: Not on file   Transportation Needs: No Transportation Needs (11/4/2022)    PRAPARE - Transportation     Lack of Transportation (Medical): No     Lack of Transportation (Non-Medical):  No   Physical Activity: Not on file   Stress: Not on file   Social Connections: Not on file   Intimate Partner Violence: Not on file   Housing Stability: Not on file      Medications and Allergies:     Current Outpatient Medications   Medication Sig Dispense Refill    amLODIPine (NORVASC) 2.5 mg tablet TAKE 1 TABLET DAILY 90 tablet 1    baclofen 20 mg tablet Take 0.5 tablets (10 mg total) by mouth 3 (three) times a day (Patient taking differently: Take 20 mg by mouth 4 (four) times a day) 270 tablet 3    butalbital-acetaminophen-caffeine (FIORICET,ESGIC) -40 mg per tablet Take 2 tablets by mouth every 6 (six) hours as needed for headaches 180 tablet 1    Calcium Carbonate (CALCIUM 600 PO) Take by mouth      Cranberry 1000 MG CAPS Take by mouth      Dalfampridine ER (Ampyra) 10 MG TB12 Take 1 tablet (10 mg total) by mouth 2 (two) times a day 90 tablet 3    furosemide (LASIX) 40 mg tablet Use daily as needed for edema 90 tablet 3    nitrofurantoin (MACRODANTIN) 100 mg capsule Take every other day and post- coital 60 capsule 3    ocrelizumab 300 MG/10ML SOLN Infuse 600 mg into a venous catheter every 6 (six) months      olmesartan (BENICAR) 40 mg tablet TAKE 1 TABLET DAILY 90 tablet 1    oxybutynin (DITROPAN-XL) 10 MG 24 hr tablet Take 1 tablet (10 mg total) by mouth daily 90 tablet 3    potassium chloride (K-DUR,KLOR-CON) 20 mEq tablet Daily as needed for edema 90 tablet 3    RESTASIS 0.05 % ophthalmic emulsion Administer 1 drop to both eyes every 12 (twelve) hours 5.5 mL 1    rosuvastatin (CRESTOR) 10 MG tablet Take 1 tablet (10 mg total) by mouth daily 90 tablet 3    tiZANidine (ZANAFLEX) 4 mg tablet Take 1 tablet (4 mg total) by mouth every 8 (eight) hours as needed for muscle spasms (Patient taking differently: Take 4 mg by mouth every 8 (eight) hours) 270 tablet 3    trospium (SANCTURA XR) 60 mg 24 hr capsule TAKE 1 CAPSULE BY MOUTH  DAILY BEFORE BREAKFAST 90 capsule 3    zolpidem (AMBIEN) 10 mg tablet Take 1 tablet (10 mg total) by mouth daily at bedtime as needed for sleep 90 tablet 0     No current facility-administered medications for this visit. No Known Allergies   Immunizations:     Immunization History   Administered Date(s) Administered    INFLUENZA 09/13/2011    Influenza Quadrivalent Preservative Free 3 years and older IM 11/20/2014, 12/11/2015    Influenza Quadrivalent, 6-35 Months IM 12/14/2016, 10/18/2017    Influenza, recombinant, quadrivalent,injectable, preservative free 11/09/2018, 10/17/2019, 10/19/2020    Influenza, seasonal, injectable 11/15/2013    Pneumococcal Polysaccharide PPV23 10/07/2010    Tdap 11/08/2010      Health Maintenance:         Topic Date Due    DXA SCAN  Never done    Breast Cancer Screening: Mammogram  11/16/2023    HIV Screening  11/21/2025 (Originally 2/26/1975)    Colorectal Cancer Screening  11/21/2025    Hepatitis C Screening  Completed         Topic Date Due    COVID-19 Vaccine (1) Never done    Influenza Vaccine (1) 09/01/2023      Medicare Screening Tests and Risk Assessments:     Maria T Patel is here for her Subsequent Wellness visit. Last Medicare Wellness visit information reviewed, patient interviewed, no change since last AWV. Health Risk Assessment:   Patient rates overall health as good. Patient feels that their physical health rating is same. Patient is very satisfied with their life. Eyesight was rated as same. Hearing was rated as same. Patient feels that their emotional and mental health rating is same. Patients states they are never, rarely angry. Patient states they are sometimes unusually tired/fatigued. Pain experienced in the last 7 days has been none. Patient states that she has experienced no weight loss or gain in last 6 months. Depression Screening:   PHQ-2 Score: 0      Fall Risk Screening: In the past year, patient has experienced: no history of falling in past year      Urinary Incontinence Screening:   Patient has not leaked urine accidently in the last six months. Home Safety:  Patient does not have trouble with stairs inside or outside of their home. Patient has working smoke alarms and has working carbon monoxide detector. Home safety hazards include: none. Nutrition:   Current diet is Regular. Medications:   Patient is not currently taking any over-the-counter supplements. Patient is able to manage medications. Activities of Daily Living (ADLs)/Instrumental Activities of Daily Living (IADLs):   Walk and transfer into and out of bed and chair?: Yes  Dress and groom yourself?: Yes    Bathe or shower yourself?: Yes    Feed yourself? Yes  Do your laundry/housekeeping?: Yes  Manage your money, pay your bills and track your expenses?: Yes  Make your own meals?: Yes    Do your own shopping?: Yes    Previous Hospitalizations:   Any hospitalizations or ED visits within the last 12 months?: No      Advance Care Planning:   Living will: Yes    Durable POA for healthcare:  Yes    Advanced directive: Yes      Cognitive Screening:   Provider or family/friend/caregiver concerned regarding cognition?: No    PREVENTIVE SCREENINGS      Cardiovascular Screening:    General: Screening Not Indicated and History Lipid Disorder      Diabetes Screening:     General: Screening Current      Colorectal Cancer Screening:     General: Screening Current      Breast Cancer Screening:     General: Screening Current      Cervical Cancer Screening:    General: Screening Current      Osteoporosis Screening:    General: Screening Not Indicated and History Osteoporosis      Abdominal Aortic Aneurysm (AAA) Screening:        General: Screening Not Indicated      Lung Cancer Screening:     General: Screening Not Indicated      Hepatitis C Screening:    General: Screening Current    Screening, Brief Intervention, and Referral to Treatment (SBIRT)    Screening  Typical number of drinks in a day: 0  Typical number of drinks in a week: 0  Interpretation: Low risk drinking behavior. Single Item Drug Screening:  How often have you used an illegal drug (including marijuana) or a prescription medication for non-medical reasons in the past year? never    Single Item Drug Screen Score: 0  Interpretation: Negative screen for possible drug use disorder    No results found. Physical Exam:     There were no vitals taken for this visit. Physical Exam  Vitals and nursing note reviewed. Constitutional:       General: She is not in acute distress. Appearance: Normal appearance. She is well-developed. She is not ill-appearing. HENT:      Head: Normocephalic and atraumatic. Eyes:      Conjunctiva/sclera: Conjunctivae normal.   Cardiovascular:      Rate and Rhythm: Normal rate and regular rhythm. Heart sounds: No murmur heard. Pulmonary:      Effort: Pulmonary effort is normal. No respiratory distress. Breath sounds: Normal breath sounds. Abdominal:      Tenderness: There is no abdominal tenderness. Musculoskeletal:      Cervical back: Neck supple. Comments: Minimal edema ankle bilateral    Uses walker   Skin:     General: Skin is warm and dry. Capillary Refill: Capillary refill takes less than 2 seconds.    Neurological:      Mental Status: She is alert and oriented to person, place, and time. Mental status is at baseline. Psychiatric:         Mood and Affect: Mood normal.         Behavior: Behavior normal.         Thought Content:  Thought content normal.         Judgment: Judgment normal.          Giuseppe Schafer,

## 2023-11-20 RX ORDER — OXYBUTYNIN CHLORIDE 15 MG/1
15 TABLET, EXTENDED RELEASE ORAL DAILY
Qty: 90 TABLET | Refills: 3 | Status: SHIPPED | OUTPATIENT
Start: 2023-11-20 | End: 2023-12-08 | Stop reason: SDUPTHER

## 2023-11-20 RX ORDER — FUROSEMIDE 40 MG/1
TABLET ORAL
Qty: 90 TABLET | Refills: 3 | Status: SHIPPED | OUTPATIENT
Start: 2023-11-20

## 2023-11-20 RX ORDER — ROSUVASTATIN CALCIUM 10 MG/1
10 TABLET, COATED ORAL DAILY
Qty: 90 TABLET | Refills: 3 | Status: SHIPPED | OUTPATIENT
Start: 2023-11-20

## 2023-11-20 RX ORDER — NITROFURANTOIN MACROCRYSTALS 100 MG/1
CAPSULE ORAL
Qty: 60 CAPSULE | Refills: 3 | Status: SHIPPED | OUTPATIENT
Start: 2023-11-20

## 2023-11-20 RX ORDER — ZOLPIDEM TARTRATE 10 MG/1
10 TABLET ORAL
Qty: 90 TABLET | Refills: 0 | Status: SHIPPED | OUTPATIENT
Start: 2023-11-20

## 2023-11-20 RX ORDER — OLMESARTAN MEDOXOMIL 40 MG/1
40 TABLET ORAL DAILY
Qty: 90 TABLET | Refills: 3 | Status: SHIPPED | OUTPATIENT
Start: 2023-11-20

## 2023-11-20 RX ORDER — TROSPIUM CHLORIDE ER 60 MG/1
60 CAPSULE ORAL
Qty: 90 CAPSULE | Refills: 3 | Status: SHIPPED | OUTPATIENT
Start: 2023-11-20

## 2023-11-20 RX ORDER — AMLODIPINE BESYLATE 2.5 MG/1
2.5 TABLET ORAL DAILY
Qty: 90 TABLET | Refills: 3 | Status: SHIPPED | OUTPATIENT
Start: 2023-11-20

## 2023-11-20 RX ORDER — BUTALBITAL, ACETAMINOPHEN AND CAFFEINE 50; 325; 40 MG/1; MG/1; MG/1
2 TABLET ORAL EVERY 4 HOURS PRN
Qty: 30 TABLET | Refills: 1 | Status: SHIPPED | OUTPATIENT
Start: 2023-11-20 | End: 2023-12-08 | Stop reason: SDUPTHER

## 2023-12-05 DIAGNOSIS — E55.9 VITAMIN D DEFICIENCY: ICD-10-CM

## 2023-12-05 DIAGNOSIS — E83.52 HYPERCALCEMIA: Primary | ICD-10-CM

## 2023-12-06 LAB
25(OH)D3+25(OH)D2 SERPL-MCNC: 28 NG/ML (ref 30–100)
ALBUMIN SERPL-MCNC: 4.7 G/DL (ref 3.9–4.9)
ALBUMIN/GLOB SERPL: 2.4 {RATIO} (ref 1.2–2.2)
ALP SERPL-CCNC: 39 IU/L (ref 44–121)
ALT SERPL-CCNC: 22 IU/L (ref 0–32)
AST SERPL-CCNC: 21 IU/L (ref 0–40)
BILIRUB SERPL-MCNC: <0.2 MG/DL (ref 0–1.2)
BUN SERPL-MCNC: 20 MG/DL (ref 8–27)
BUN/CREAT SERPL: 29 (ref 12–28)
CALCIUM SERPL-MCNC: 10.1 MG/DL (ref 8.7–10.3)
CHLORIDE SERPL-SCNC: 103 MMOL/L (ref 96–106)
CO2 SERPL-SCNC: 24 MMOL/L (ref 20–29)
CREAT SERPL-MCNC: 0.68 MG/DL (ref 0.57–1)
EGFR: 98 ML/MIN/1.73
GLOBULIN SER-MCNC: 2 G/DL (ref 1.5–4.5)
GLUCOSE SERPL-MCNC: 101 MG/DL (ref 70–99)
POTASSIUM SERPL-SCNC: 4.5 MMOL/L (ref 3.5–5.2)
PROT SERPL-MCNC: 6.7 G/DL (ref 6–8.5)
SODIUM SERPL-SCNC: 144 MMOL/L (ref 134–144)

## 2023-12-08 DIAGNOSIS — N31.9 BLADDER DYSFUNCTION: ICD-10-CM

## 2023-12-08 DIAGNOSIS — G43.909 MIGRAINE WITHOUT STATUS MIGRAINOSUS, NOT INTRACTABLE, UNSPECIFIED MIGRAINE TYPE: ICD-10-CM

## 2023-12-08 RX ORDER — BUTALBITAL, ACETAMINOPHEN AND CAFFEINE 50; 325; 40 MG/1; MG/1; MG/1
2 TABLET ORAL EVERY 6 HOURS PRN
Qty: 90 TABLET | Refills: 1 | Status: SHIPPED | OUTPATIENT
Start: 2023-12-08

## 2023-12-08 RX ORDER — OXYBUTYNIN CHLORIDE 15 MG/1
15 TABLET, EXTENDED RELEASE ORAL DAILY
Qty: 90 TABLET | Refills: 3 | Status: SHIPPED | OUTPATIENT
Start: 2023-12-08

## 2023-12-14 ENCOUNTER — TELEPHONE (OUTPATIENT)
Dept: ADMINISTRATIVE | Facility: OTHER | Age: 63
End: 2023-12-14

## 2023-12-14 NOTE — TELEPHONE ENCOUNTER
----- Message from [facility] sent at 12/14/2023  9:26 AM EST -----  12/14/23 9:26 AM    Hello, our patient Jassi Alcala has had Mammogram completed/performed. Please assist in updating the patient chart by pulling the Care Everywhere (CE) document. The date of service is 12/13/2023.      Thank you,  Jossie Sanford

## 2023-12-14 NOTE — TELEPHONE ENCOUNTER
Upon review of the In Basket request we  CE interfaced Mammogram to HM. Any additional questions or concerns should be emailed to the Practice Liaisons via the appropriate education email address, please do not reply via In Basket.     Thank you  Liliana Mejía MA

## 2023-12-20 DIAGNOSIS — R05.1 ACUTE COUGH: Primary | ICD-10-CM

## 2023-12-20 RX ORDER — BENZONATATE 200 MG/1
200 CAPSULE ORAL 3 TIMES DAILY PRN
Qty: 30 CAPSULE | Refills: 1 | Status: SHIPPED | OUTPATIENT
Start: 2023-12-20 | End: 2023-12-21 | Stop reason: SDUPTHER

## 2023-12-21 DIAGNOSIS — R05.1 ACUTE COUGH: ICD-10-CM

## 2023-12-21 RX ORDER — BENZONATATE 200 MG/1
200 CAPSULE ORAL 3 TIMES DAILY PRN
Qty: 30 CAPSULE | Refills: 1 | Status: SHIPPED | OUTPATIENT
Start: 2023-12-21 | End: 2023-12-22 | Stop reason: SDUPTHER

## 2023-12-22 DIAGNOSIS — R05.1 ACUTE COUGH: ICD-10-CM

## 2023-12-22 RX ORDER — BENZONATATE 200 MG/1
200 CAPSULE ORAL 3 TIMES DAILY PRN
Qty: 30 CAPSULE | Refills: 1 | Status: SHIPPED | OUTPATIENT
Start: 2023-12-22

## 2024-01-06 DIAGNOSIS — J06.9 UPPER RESPIRATORY TRACT INFECTION, UNSPECIFIED TYPE: Primary | ICD-10-CM

## 2024-01-06 RX ORDER — AZITHROMYCIN 250 MG/1
TABLET, FILM COATED ORAL
Qty: 6 TABLET | Refills: 0 | Status: SHIPPED | OUTPATIENT
Start: 2024-01-06 | End: 2024-01-10

## 2024-01-09 DIAGNOSIS — I10 PRIMARY HYPERTENSION: ICD-10-CM

## 2024-01-09 DIAGNOSIS — R60.1 GENERALIZED EDEMA: ICD-10-CM

## 2024-01-09 DIAGNOSIS — E78.5 HYPERLIPIDEMIA, UNSPECIFIED HYPERLIPIDEMIA TYPE: ICD-10-CM

## 2024-01-09 DIAGNOSIS — N32.9 BLADDER DISORDER: ICD-10-CM

## 2024-01-09 RX ORDER — OLMESARTAN MEDOXOMIL 40 MG/1
40 TABLET ORAL DAILY
Qty: 90 TABLET | Refills: 3 | Status: SHIPPED | OUTPATIENT
Start: 2024-01-09 | End: 2024-01-09 | Stop reason: SDUPTHER

## 2024-01-09 RX ORDER — ROSUVASTATIN CALCIUM 10 MG/1
10 TABLET, COATED ORAL DAILY
Qty: 90 TABLET | Refills: 3 | Status: SHIPPED | OUTPATIENT
Start: 2024-01-09 | End: 2024-01-09 | Stop reason: SDUPTHER

## 2024-01-09 RX ORDER — OLMESARTAN MEDOXOMIL 40 MG/1
40 TABLET ORAL DAILY
Qty: 90 TABLET | Refills: 3 | Status: SHIPPED | OUTPATIENT
Start: 2024-01-09 | End: 2024-01-09

## 2024-01-09 RX ORDER — FUROSEMIDE 40 MG/1
TABLET ORAL
Qty: 90 TABLET | Refills: 3 | Status: SHIPPED | OUTPATIENT
Start: 2024-01-09 | End: 2024-01-09 | Stop reason: SDUPTHER

## 2024-01-09 RX ORDER — FUROSEMIDE 40 MG/1
TABLET ORAL
Qty: 90 TABLET | Refills: 3 | Status: SHIPPED | OUTPATIENT
Start: 2024-01-09 | End: 2024-01-09

## 2024-01-09 RX ORDER — AMLODIPINE BESYLATE 2.5 MG/1
2.5 TABLET ORAL DAILY
Qty: 90 TABLET | Refills: 3 | Status: SHIPPED | OUTPATIENT
Start: 2024-01-09 | End: 2024-01-09 | Stop reason: SDUPTHER

## 2024-01-09 RX ORDER — AMLODIPINE BESYLATE 2.5 MG/1
2.5 TABLET ORAL DAILY
Qty: 90 TABLET | Refills: 3 | Status: SHIPPED | OUTPATIENT
Start: 2024-01-09 | End: 2024-01-09

## 2024-01-09 RX ORDER — TROSPIUM CHLORIDE ER 60 MG/1
60 CAPSULE ORAL
Qty: 90 CAPSULE | Refills: 3 | Status: SHIPPED | OUTPATIENT
Start: 2024-01-09 | End: 2024-01-09

## 2024-01-09 RX ORDER — TROSPIUM CHLORIDE ER 60 MG/1
60 CAPSULE ORAL
Qty: 90 CAPSULE | Refills: 3 | Status: SHIPPED | OUTPATIENT
Start: 2024-01-09 | End: 2024-01-09 | Stop reason: SDUPTHER

## 2024-01-09 RX ORDER — ROSUVASTATIN CALCIUM 10 MG/1
10 TABLET, COATED ORAL DAILY
Qty: 90 TABLET | Refills: 3 | Status: SHIPPED | OUTPATIENT
Start: 2024-01-09 | End: 2024-01-09

## 2024-01-18 LAB
CHOLEST SERPL-MCNC: 164 MG/DL (ref 100–199)
HDLC SERPL-MCNC: 77 MG/DL
LDLC SERPL CALC-MCNC: 70 MG/DL (ref 0–99)
SL AMB VLDL CHOLESTEROL CALC: 17 MG/DL (ref 5–40)
TRIGL SERPL-MCNC: 93 MG/DL (ref 0–149)

## 2024-01-19 DIAGNOSIS — E78.5 HYPERLIPIDEMIA, UNSPECIFIED HYPERLIPIDEMIA TYPE: ICD-10-CM

## 2024-01-19 DIAGNOSIS — I10 PRIMARY HYPERTENSION: Primary | ICD-10-CM

## 2024-01-19 DIAGNOSIS — R60.1 GENERALIZED EDEMA: ICD-10-CM

## 2024-01-19 DIAGNOSIS — N32.9 BLADDER DISORDER: ICD-10-CM

## 2024-01-19 RX ORDER — FUROSEMIDE 40 MG/1
40 TABLET ORAL DAILY
Qty: 90 TABLET | Refills: 3 | Status: SHIPPED | OUTPATIENT
Start: 2024-01-19

## 2024-01-19 RX ORDER — TROSPIUM CHLORIDE ER 60 MG/1
60 CAPSULE ORAL
Qty: 90 CAPSULE | Refills: 3 | Status: SHIPPED | OUTPATIENT
Start: 2024-01-19

## 2024-01-19 RX ORDER — OLMESARTAN MEDOXOMIL 40 MG/1
40 TABLET ORAL DAILY
Qty: 90 TABLET | Refills: 3 | Status: SHIPPED | OUTPATIENT
Start: 2024-01-19

## 2024-01-19 RX ORDER — ROSUVASTATIN CALCIUM 10 MG/1
10 TABLET, COATED ORAL DAILY
Qty: 90 TABLET | Refills: 3 | Status: SHIPPED | OUTPATIENT
Start: 2024-01-19

## 2024-01-19 RX ORDER — AMLODIPINE BESYLATE 2.5 MG/1
2.5 TABLET ORAL DAILY
Qty: 90 TABLET | Refills: 3 | Status: SHIPPED | OUTPATIENT
Start: 2024-01-19

## 2024-01-25 DIAGNOSIS — F51.01 PRIMARY INSOMNIA: ICD-10-CM

## 2024-01-25 DIAGNOSIS — N31.9 BLADDER DYSFUNCTION: ICD-10-CM

## 2024-01-25 DIAGNOSIS — R39.9 UTI SYMPTOMS: ICD-10-CM

## 2024-01-25 RX ORDER — NITROFURANTOIN MACROCRYSTALS 100 MG/1
CAPSULE ORAL
Qty: 60 CAPSULE | Refills: 3 | Status: SHIPPED | OUTPATIENT
Start: 2024-01-25

## 2024-01-25 RX ORDER — OXYBUTYNIN CHLORIDE 15 MG/1
15 TABLET, EXTENDED RELEASE ORAL DAILY
Qty: 90 TABLET | Refills: 3 | Status: SHIPPED | OUTPATIENT
Start: 2024-01-25

## 2024-01-25 RX ORDER — ZOLPIDEM TARTRATE 10 MG/1
10 TABLET ORAL
Qty: 90 TABLET | Refills: 0 | Status: SHIPPED | OUTPATIENT
Start: 2024-01-25

## 2024-03-12 DIAGNOSIS — G43.909 MIGRAINE WITHOUT STATUS MIGRAINOSUS, NOT INTRACTABLE, UNSPECIFIED MIGRAINE TYPE: ICD-10-CM

## 2024-03-12 DIAGNOSIS — F51.01 PRIMARY INSOMNIA: ICD-10-CM

## 2024-03-12 RX ORDER — ZOLPIDEM TARTRATE 10 MG/1
10 TABLET ORAL
Qty: 90 TABLET | Refills: 0 | Status: SHIPPED | OUTPATIENT
Start: 2024-03-12

## 2024-03-12 RX ORDER — BUTALBITAL, ACETAMINOPHEN AND CAFFEINE 50; 325; 40 MG/1; MG/1; MG/1
2 TABLET ORAL EVERY 6 HOURS PRN
Qty: 90 TABLET | Refills: 1 | Status: SHIPPED | OUTPATIENT
Start: 2024-03-12

## 2024-03-26 DIAGNOSIS — R39.9 UTI SYMPTOMS: ICD-10-CM

## 2024-03-26 RX ORDER — NITROFURANTOIN MACROCRYSTALS 100 MG/1
CAPSULE ORAL
Qty: 60 CAPSULE | Refills: 0 | Status: SHIPPED | OUTPATIENT
Start: 2024-03-26

## 2024-08-07 DIAGNOSIS — Z13.29 SCREENING FOR THYROID DISORDER: ICD-10-CM

## 2024-08-07 DIAGNOSIS — Z13.1 SCREENING FOR DIABETES MELLITUS: ICD-10-CM

## 2024-08-07 DIAGNOSIS — I10 ESSENTIAL HYPERTENSION: Primary | ICD-10-CM

## 2024-08-07 DIAGNOSIS — E78.5 HYPERLIPIDEMIA, UNSPECIFIED HYPERLIPIDEMIA TYPE: ICD-10-CM

## 2024-08-07 DIAGNOSIS — G35 MULTIPLE SCLEROSIS (HCC): ICD-10-CM

## 2024-08-08 LAB
HBA1C MFR BLD: 5.7 % (ref 4.8–5.6)
TSH SERPL DL<=0.005 MIU/L-ACNC: 3.94 UIU/ML (ref 0.45–4.5)

## 2024-08-08 NOTE — RESULT ENCOUNTER NOTE
As you can see, thyroid weight is at goal.  Slight bump in hemoglobin A1c but of no concern.  Obviously watch carbs sweets etc. Hope things are stable for you

## 2025-03-14 ENCOUNTER — TELEPHONE (OUTPATIENT)
Dept: FAMILY MEDICINE CLINIC | Facility: CLINIC | Age: 65
End: 2025-03-14

## 2025-03-14 NOTE — TELEPHONE ENCOUNTER
Patient has not been seen in the office since 2023. I called patient to try and get her schedule with any provider in the office. Patient's phone disconnected.   Please try again to schedule patient.

## (undated) DEVICE — DRILL BIT QUICK COUPLE BROWN 2

## (undated) DEVICE — ADHESIVE SKIN DERMABOND ADVANCED 0.7ML

## (undated) DEVICE — CASTING ROLL PLASTER 4IN

## (undated) DEVICE — DRAPE LARGE REVERSE FOLD

## (undated) DEVICE — PAD GROUND ELECTROSURGICAL W/CORD

## (undated) DEVICE — DRAPE MINI C-ARM OEC FLUOROSCAN

## (undated) DEVICE — DRAPE-U NON-STERILE

## (undated) DEVICE — WIRE KIRSCHNER TROCAR POINT

## (undated) DEVICE — BLADE SCALPEL #15

## (undated) DEVICE — PACK RFID HAND

## (undated) DEVICE — DRILL BIT 2.5MM/QC/GOLD/180MM

## (undated) DEVICE — DRESSING ABD STER LGE 8X10

## (undated) DEVICE — APPLICATOR CHLORAPREP 10.5ML ORANGE TINT

## (undated) DEVICE — DRAPE C-ARMOR

## (undated) DEVICE — GOWN SURGICAL REINFORCED X-LAR

## (undated) DEVICE — TUBE SUCTION 1/4INX20FT STERILE

## (undated) DEVICE — BANDAGE ACE 6IN X 5.5 YDS

## (undated) DEVICE — SOLN IRRIG .9%SOD 500ML

## (undated) DEVICE — BANDAGE COHESIVE 4IN

## (undated) DEVICE — ***USE 57698*** SLEEVE FLOWTRON DVT CALF SINGLE USE

## (undated) DEVICE — BIT DRILL 2.0MM

## (undated) DEVICE — CAST PADDING 4IN

## (undated) DEVICE — SUTURE MONOCRYL 3-0  Y497G

## (undated) DEVICE — APPLICATOR CHLORAPREP 26ML ORANGE TINT

## (undated) DEVICE — GLOVE SZ 8 LINER PROTEXIS PI BL

## (undated) DEVICE — Device

## (undated) DEVICE — DRILL BIT QUICK COUPLING 2.8X165MM

## (undated) DEVICE — BLANKET UPPER BODY BAIR HUGGER

## (undated) DEVICE — WRAP COBAN LATEX FREE 6IN STERILE

## (undated) DEVICE — SUCTION 18FR FRAZIER DISPOSABLE

## (undated) DEVICE — BIT DRILL

## (undated) DEVICE — DRESSING ADAPTIC STERILE 3X3

## (undated) DEVICE — SYRINGE DISP LUER-LOK 10 CC

## (undated) DEVICE — CASTING ROLL PLASTER 6IN

## (undated) DEVICE — GLOVE SZ 7.5 PROTEXIS PI

## (undated) DEVICE — PENCIL ESU HANDSWITCHING W/HOL

## (undated) DEVICE — CUFF TOURNIQUET REPROCESSED 18IN

## (undated) DEVICE — NEEDLE DISP HYPO 18GX1-1/2IN

## (undated) DEVICE — SUTURE VICRYL PLUS 2-0 VCP869H